# Patient Record
Sex: FEMALE | Race: WHITE
[De-identification: names, ages, dates, MRNs, and addresses within clinical notes are randomized per-mention and may not be internally consistent; named-entity substitution may affect disease eponyms.]

---

## 2017-01-07 ENCOUNTER — TRANSCRIPTION ENCOUNTER (OUTPATIENT)
Age: 52
End: 2017-01-07

## 2017-02-01 ENCOUNTER — EMERGENCY (EMERGENCY)
Facility: HOSPITAL | Age: 52
LOS: 0 days | Discharge: HOME | End: 2017-02-02

## 2017-06-22 ENCOUNTER — OUTPATIENT (OUTPATIENT)
Dept: OUTPATIENT SERVICES | Facility: HOSPITAL | Age: 52
LOS: 1 days | Discharge: HOME | End: 2017-06-22

## 2017-06-22 DIAGNOSIS — K62.89 OTHER SPECIFIED DISEASES OF ANUS AND RECTUM: ICD-10-CM

## 2017-06-22 DIAGNOSIS — K59.00 CONSTIPATION, UNSPECIFIED: ICD-10-CM

## 2017-06-27 DIAGNOSIS — K57.32 DIVERTICULITIS OF LARGE INTESTINE WITHOUT PERFORATION OR ABSCESS WITHOUT BLEEDING: ICD-10-CM

## 2017-06-27 DIAGNOSIS — R10.84 GENERALIZED ABDOMINAL PAIN: ICD-10-CM

## 2017-06-27 DIAGNOSIS — Z98.890 OTHER SPECIFIED POSTPROCEDURAL STATES: ICD-10-CM

## 2017-06-28 DIAGNOSIS — Z12.31 ENCOUNTER FOR SCREENING MAMMOGRAM FOR MALIGNANT NEOPLASM OF BREAST: ICD-10-CM

## 2017-07-17 ENCOUNTER — OUTPATIENT (OUTPATIENT)
Dept: OUTPATIENT SERVICES | Facility: HOSPITAL | Age: 52
LOS: 1 days | Discharge: HOME | End: 2017-07-17

## 2017-07-17 DIAGNOSIS — K59.00 CONSTIPATION, UNSPECIFIED: ICD-10-CM

## 2017-07-17 DIAGNOSIS — R92.8 OTHER ABNORMAL AND INCONCLUSIVE FINDINGS ON DIAGNOSTIC IMAGING OF BREAST: ICD-10-CM

## 2017-07-17 DIAGNOSIS — K62.89 OTHER SPECIFIED DISEASES OF ANUS AND RECTUM: ICD-10-CM

## 2018-08-01 ENCOUNTER — OUTPATIENT (OUTPATIENT)
Dept: OUTPATIENT SERVICES | Facility: HOSPITAL | Age: 53
LOS: 1 days | Discharge: HOME | End: 2018-08-01

## 2018-08-01 DIAGNOSIS — Z12.31 ENCOUNTER FOR SCREENING MAMMOGRAM FOR MALIGNANT NEOPLASM OF BREAST: ICD-10-CM

## 2019-04-22 ENCOUNTER — OUTPATIENT (OUTPATIENT)
Dept: OUTPATIENT SERVICES | Facility: HOSPITAL | Age: 54
LOS: 1 days | Discharge: HOME | End: 2019-04-22
Payer: MEDICAID

## 2019-04-22 DIAGNOSIS — N64.4 MASTODYNIA: ICD-10-CM

## 2019-04-22 PROCEDURE — 77062 BREAST TOMOSYNTHESIS BI: CPT | Mod: 26

## 2019-04-22 PROCEDURE — 76642 ULTRASOUND BREAST LIMITED: CPT | Mod: 26,LT

## 2019-04-22 PROCEDURE — 76641 ULTRASOUND BREAST COMPLETE: CPT | Mod: 26,RT

## 2019-04-22 PROCEDURE — G0279: CPT | Mod: 26

## 2019-04-22 PROCEDURE — 77066 DX MAMMO INCL CAD BI: CPT | Mod: 26

## 2020-06-30 ENCOUNTER — OUTPATIENT (OUTPATIENT)
Dept: OUTPATIENT SERVICES | Facility: HOSPITAL | Age: 55
LOS: 1 days | Discharge: HOME | End: 2020-06-30
Payer: MEDICAID

## 2020-06-30 DIAGNOSIS — J18.9 PNEUMONIA, UNSPECIFIED ORGANISM: ICD-10-CM

## 2020-06-30 PROCEDURE — 71046 X-RAY EXAM CHEST 2 VIEWS: CPT | Mod: 26

## 2020-07-09 ENCOUNTER — OUTPATIENT (OUTPATIENT)
Dept: OUTPATIENT SERVICES | Facility: HOSPITAL | Age: 55
LOS: 1 days | Discharge: HOME | End: 2020-07-09
Payer: MEDICAID

## 2020-07-09 DIAGNOSIS — Z12.31 ENCOUNTER FOR SCREENING MAMMOGRAM FOR MALIGNANT NEOPLASM OF BREAST: ICD-10-CM

## 2020-07-09 PROCEDURE — 77067 SCR MAMMO BI INCL CAD: CPT | Mod: 26

## 2020-07-09 PROCEDURE — 77063 BREAST TOMOSYNTHESIS BI: CPT | Mod: 26

## 2020-08-03 ENCOUNTER — EMERGENCY (EMERGENCY)
Facility: HOSPITAL | Age: 55
LOS: 0 days | Discharge: HOME | End: 2020-08-04
Attending: EMERGENCY MEDICINE | Admitting: EMERGENCY MEDICINE
Payer: MEDICAID

## 2020-08-03 VITALS
HEART RATE: 110 BPM | RESPIRATION RATE: 18 BRPM | SYSTOLIC BLOOD PRESSURE: 165 MMHG | TEMPERATURE: 99 F | WEIGHT: 164.02 LBS | OXYGEN SATURATION: 99 % | DIASTOLIC BLOOD PRESSURE: 101 MMHG

## 2020-08-03 DIAGNOSIS — R39.198 OTHER DIFFICULTIES WITH MICTURITION: ICD-10-CM

## 2020-08-03 DIAGNOSIS — K59.00 CONSTIPATION, UNSPECIFIED: ICD-10-CM

## 2020-08-03 PROCEDURE — 99284 EMERGENCY DEPT VISIT MOD MDM: CPT

## 2020-08-03 NOTE — ED ADULT TRIAGE NOTE - CHIEF COMPLAINT QUOTE
pt states she is constipated and cannot urinate. states the last time she urinated was an hour ago and it was a little amount. states her last bm was this afternoon. pt has hx of same problem, had urinary catheter placed.

## 2020-08-04 VITALS
DIASTOLIC BLOOD PRESSURE: 69 MMHG | SYSTOLIC BLOOD PRESSURE: 125 MMHG | OXYGEN SATURATION: 98 % | HEART RATE: 95 BPM | RESPIRATION RATE: 18 BRPM | TEMPERATURE: 98 F

## 2020-08-04 LAB
APPEARANCE UR: CLEAR — SIGNIFICANT CHANGE UP
BACTERIA # UR AUTO: NEGATIVE — SIGNIFICANT CHANGE UP
BILIRUB UR-MCNC: NEGATIVE — SIGNIFICANT CHANGE UP
COLOR SPEC: COLORLESS — SIGNIFICANT CHANGE UP
DIFF PNL FLD: ABNORMAL
EPI CELLS # UR: 0 /HPF — SIGNIFICANT CHANGE UP (ref 0–5)
GLUCOSE UR QL: NEGATIVE — SIGNIFICANT CHANGE UP
HYALINE CASTS # UR AUTO: 1 /LPF — SIGNIFICANT CHANGE UP (ref 0–7)
KETONES UR-MCNC: NEGATIVE — SIGNIFICANT CHANGE UP
LEUKOCYTE ESTERASE UR-ACNC: NEGATIVE — SIGNIFICANT CHANGE UP
NITRITE UR-MCNC: NEGATIVE — SIGNIFICANT CHANGE UP
PH UR: 6 — SIGNIFICANT CHANGE UP (ref 5–8)
PROT UR-MCNC: NEGATIVE — SIGNIFICANT CHANGE UP
RBC CASTS # UR COMP ASSIST: 5 /HPF — HIGH (ref 0–4)
SP GR SPEC: 1.01 — LOW (ref 1.01–1.02)
UROBILINOGEN FLD QL: SIGNIFICANT CHANGE UP
WBC UR QL: 0 /HPF — SIGNIFICANT CHANGE UP (ref 0–5)

## 2020-08-04 RX ADMIN — Medication 1 ENEMA: at 01:19

## 2020-08-04 NOTE — ED PROVIDER NOTE - NS ED ROS FT
Constitutional: no fever, chills, no recent weight loss, change in appetite or malaise  Eyes: no redness/discharge/pain/vision changes  ENT: no rhinorrhea/ear pain/sore throat  Cardiac: No chest pain, SOB or edema.  Respiratory: No cough or respiratory distress  GI: No nausea, vomiting, abdominal pain.  : No dysuria, frequency, urgency or hematuria  MS: no pain to back or extremities, no loss of ROM, no weakness  Neuro: No headache or weakness. No LOC.  Skin: No skin rash.  Endocrine: No history of thyroid disease or diabetes.  Except as documented in the HPI, all other systems are negative.

## 2020-08-04 NOTE — ED PROVIDER NOTE - PROGRESS NOTE DETAILS
pt refused to have RN give enema, I placed enema; pt sts has urge to defecate but "scared"; pt had large BM, feels much better. wants to go home. will f/u with GI and take otc stool softeners. Pt also urinated in ED. Full DC instructions discussed and patient knows when to seek immediate medical attention. Patient has proper follow-up. All results discussed with the patient they may require further work-up. Limitations of ED work-up discussed. All  questions and concerns from patient or family addressed. Understanding of insturctions verbalized BB: pt refused to have RN give enema, I placed enema; pt sts has urge to defecate but "scared"; BB: pt had large BM, feels much better. wants to go home. will f/u with GI and take otc stool softeners.

## 2020-08-04 NOTE — ED PROVIDER NOTE - CARE PROVIDER_API CALL
Luca Snowden  GASTROENTEROLOGY  [address]  [place] [address]  Phone: [phone]  Fax: [phone]  Follow Up Time:

## 2020-08-04 NOTE — ED PROVIDER NOTE - PHYSICAL EXAMINATION
CONSTITUTIONAL: Well-appearing; well-nourished; in no apparent distress.   CARDIOVASCULAR: Normal S1, S2; no murmurs, rubs, or gallops.   RESPIRATORY: Normal chest excursion with respiration; breath sounds clear and equal bilaterally; no wheezes, rhonchi, or rales.  GI/: Normal bowel sounds; non-distended; non-tender; no palpable organomegaly.   MS: No CVA tenderness.   SKIN: Normal for age and race; warm; dry; good turgor; no apparent lesions or exudate.   NEURO/PSYCH: A & O x 4; grossly unremarkable. mood and manner are appropriate. Grooming and personal hygiene are appropriate. No apparent thoughts of harm to self or others.

## 2020-08-04 NOTE — ED PROVIDER NOTE - PATIENT PORTAL LINK FT
You can access the FollowMyHealth Patient Portal offered by [facility] by registering at the following website: [email or web address]. By joining [employer]’s FollowMyHealth portal, you will also be able to view your health information using other applications (apps) compatible with our system.

## 2020-08-04 NOTE — ED ADULT NURSE REASSESSMENT NOTE - NS ED NURSE REASSESS COMMENT FT1
Patient is A&OX4 in NAD received fleet enema as per MD order. Patient had large BM and states feeling a lot better. Patient denies any sob or chest pain. Dispo pending

## 2020-08-04 NOTE — ED ADULT NURSE NOTE - OBJECTIVE STATEMENT
Patient presents to Er with son in NAD A&OX4 complaining she is constipated and cannot urinate. Patient states last urinated at 6 PM this evening and last moved her bowels was this morning. Patient denies any sob or chest pain at this time,

## 2020-08-04 NOTE — ED PROVIDER NOTE - OBJECTIVE STATEMENT
pt c/o constipation for 1-2 days and feeling as though she cannot urinate 2/2 constipation. reports similar episode in the past where she required grant cath, then enema and subsequently had resolution. requesting same. has been seen by GI, does not have upcoming appt. was planning to use enema at home, but afraid. Denies fever/chill/HA/dizziness/chest pain/palpitation/sob/abd pain/n/v

## 2020-08-04 NOTE — ED PROVIDER NOTE - CLINICAL SUMMARY MEDICAL DECISION MAKING FREE TEXT BOX
pt had large BM, feels much better. wants to go home. will f/u with GI. Full DC instructions discussed and patient knows when to seek immediate medical attention. Patient has proper follow-up. All results discussed with the patient they may require further work-up. Limitations of ED work-up discussed. All  questions and concerns from patient or family addressed. Understanding of insturctions verbalized

## 2020-08-04 NOTE — ED PROVIDER NOTE - ATTENDING CONTRIBUTION TO CARE
I personally evaluated the patient. I reviewed the Resident’s or Physician Assistant’s note (as assigned above), and agree with the findings and plan except as documented in my note.    55 y/o F w no PMH presents w 2 days of constipation and difficulty urinating. No abd pain. No n/v. No CP, SOB. No fever. No bloody stools.     CONSTITUTIONAL: NAD  SKIN: skin exam is warm and dry, no acute rash.  HEAD: Normocephalic; atraumatic.  EYES: PERRL, 3 mm bilateral, no nystagmus, EOM intact; conjunctiva and sclera clear.  ENT: No nasal discharge; airway clear.  NECK: Supple; non tender.+ full passive ROM in all directions. No JVD  CARD: S1, S2 normal; no murmurs, gallops, or rubs. Regular rate and rhythm. + Symmetric Strong Pulses  RESP: No wheezes, rales or rhonchi. Good air movement bilaterally  ABD: soft; non-distended; non-tender. No Rebound, No Gaurding, No signs of peritnitis, No CVA tenderness  EXT: Normal ROM. No clubbing, cyanosis or edema. Dp and Pt Pulses intact. Cap refill less than 3 seconds

## 2020-08-04 NOTE — ED ADULT NURSE REASSESSMENT NOTE - NS ED NURSE REASSESS COMMENT FT1
Patient was straight cath as per MD order. Patient urine was yellow in color, drained 800 CC. MD aware. Patient states is feeling better. Patient in NAD, denies any chest pain or sob.

## 2020-08-05 LAB
CULTURE RESULTS: NO GROWTH — SIGNIFICANT CHANGE UP
SPECIMEN SOURCE: SIGNIFICANT CHANGE UP

## 2021-08-07 ENCOUNTER — OUTPATIENT (OUTPATIENT)
Dept: OUTPATIENT SERVICES | Facility: HOSPITAL | Age: 56
LOS: 1 days | Discharge: HOME | End: 2021-08-07
Payer: MEDICAID

## 2021-08-07 DIAGNOSIS — Z12.31 ENCOUNTER FOR SCREENING MAMMOGRAM FOR MALIGNANT NEOPLASM OF BREAST: ICD-10-CM

## 2021-08-07 PROCEDURE — 77063 BREAST TOMOSYNTHESIS BI: CPT | Mod: 26

## 2021-08-07 PROCEDURE — 77067 SCR MAMMO BI INCL CAD: CPT | Mod: 26

## 2022-08-25 ENCOUNTER — OUTPATIENT (OUTPATIENT)
Dept: OUTPATIENT SERVICES | Facility: HOSPITAL | Age: 57
LOS: 1 days | Discharge: HOME | End: 2022-08-25

## 2022-08-25 DIAGNOSIS — Z12.31 ENCOUNTER FOR SCREENING MAMMOGRAM FOR MALIGNANT NEOPLASM OF BREAST: ICD-10-CM

## 2022-08-25 PROCEDURE — 77067 SCR MAMMO BI INCL CAD: CPT | Mod: 26

## 2022-08-25 PROCEDURE — 77063 BREAST TOMOSYNTHESIS BI: CPT | Mod: 26

## 2022-09-14 ENCOUNTER — APPOINTMENT (OUTPATIENT)
Dept: ORTHOPEDIC SURGERY | Facility: CLINIC | Age: 57
End: 2022-09-14

## 2022-10-10 ENCOUNTER — INPATIENT (INPATIENT)
Facility: HOSPITAL | Age: 57
LOS: 3 days | Discharge: HOME | End: 2022-10-14
Attending: INTERNAL MEDICINE | Admitting: INTERNAL MEDICINE

## 2022-10-10 VITALS
WEIGHT: 173.94 LBS | RESPIRATION RATE: 17 BRPM | DIASTOLIC BLOOD PRESSURE: 82 MMHG | HEIGHT: 64 IN | OXYGEN SATURATION: 98 % | SYSTOLIC BLOOD PRESSURE: 151 MMHG | HEART RATE: 99 BPM | TEMPERATURE: 98 F

## 2022-10-10 LAB
ALBUMIN SERPL ELPH-MCNC: 4.6 G/DL — SIGNIFICANT CHANGE UP (ref 3.5–5.2)
ALP SERPL-CCNC: 92 U/L — SIGNIFICANT CHANGE UP (ref 30–115)
ALT FLD-CCNC: 95 U/L — HIGH (ref 0–41)
ANION GAP SERPL CALC-SCNC: 11 MMOL/L — SIGNIFICANT CHANGE UP (ref 7–14)
AST SERPL-CCNC: 85 U/L — HIGH (ref 0–41)
BASOPHILS # BLD AUTO: 0.03 K/UL — SIGNIFICANT CHANGE UP (ref 0–0.2)
BASOPHILS NFR BLD AUTO: 0.5 % — SIGNIFICANT CHANGE UP (ref 0–1)
BILIRUB SERPL-MCNC: 0.3 MG/DL — SIGNIFICANT CHANGE UP (ref 0.2–1.2)
BUN SERPL-MCNC: 12 MG/DL — SIGNIFICANT CHANGE UP (ref 10–20)
CALCIUM SERPL-MCNC: 8.8 MG/DL — SIGNIFICANT CHANGE UP (ref 8.4–10.5)
CHLORIDE SERPL-SCNC: 102 MMOL/L — SIGNIFICANT CHANGE UP (ref 98–110)
CO2 SERPL-SCNC: 27 MMOL/L — SIGNIFICANT CHANGE UP (ref 17–32)
CREAT SERPL-MCNC: 0.6 MG/DL — LOW (ref 0.7–1.5)
CRP SERPL-MCNC: 17.2 MG/L — HIGH
EGFR: 105 ML/MIN/1.73M2 — SIGNIFICANT CHANGE UP
EOSINOPHIL # BLD AUTO: 0.06 K/UL — SIGNIFICANT CHANGE UP (ref 0–0.7)
EOSINOPHIL NFR BLD AUTO: 0.9 % — SIGNIFICANT CHANGE UP (ref 0–8)
GLUCOSE SERPL-MCNC: 102 MG/DL — HIGH (ref 70–99)
HCG SERPL QL: NEGATIVE — SIGNIFICANT CHANGE UP
HCT VFR BLD CALC: 34.3 % — LOW (ref 37–47)
HGB BLD-MCNC: 11.5 G/DL — LOW (ref 12–16)
IMM GRANULOCYTES NFR BLD AUTO: 0.3 % — SIGNIFICANT CHANGE UP (ref 0.1–0.3)
LYMPHOCYTES # BLD AUTO: 1.5 K/UL — SIGNIFICANT CHANGE UP (ref 1.2–3.4)
LYMPHOCYTES # BLD AUTO: 23 % — SIGNIFICANT CHANGE UP (ref 20.5–51.1)
MCHC RBC-ENTMCNC: 28.2 PG — SIGNIFICANT CHANGE UP (ref 27–31)
MCHC RBC-ENTMCNC: 33.5 G/DL — SIGNIFICANT CHANGE UP (ref 32–37)
MCV RBC AUTO: 84.1 FL — SIGNIFICANT CHANGE UP (ref 81–99)
MONOCYTES # BLD AUTO: 0.48 K/UL — SIGNIFICANT CHANGE UP (ref 0.1–0.6)
MONOCYTES NFR BLD AUTO: 7.4 % — SIGNIFICANT CHANGE UP (ref 1.7–9.3)
NEUTROPHILS # BLD AUTO: 4.43 K/UL — SIGNIFICANT CHANGE UP (ref 1.4–6.5)
NEUTROPHILS NFR BLD AUTO: 67.9 % — SIGNIFICANT CHANGE UP (ref 42.2–75.2)
NRBC # BLD: 0 /100 WBCS — SIGNIFICANT CHANGE UP (ref 0–0)
PLATELET # BLD AUTO: 241 K/UL — SIGNIFICANT CHANGE UP (ref 130–400)
POTASSIUM SERPL-MCNC: 5.1 MMOL/L — HIGH (ref 3.5–5)
POTASSIUM SERPL-SCNC: 5.1 MMOL/L — HIGH (ref 3.5–5)
PROT SERPL-MCNC: 7.4 G/DL — SIGNIFICANT CHANGE UP (ref 6–8)
RBC # BLD: 4.08 M/UL — LOW (ref 4.2–5.4)
RBC # FLD: 12.6 % — SIGNIFICANT CHANGE UP (ref 11.5–14.5)
SARS-COV-2 RNA SPEC QL NAA+PROBE: SIGNIFICANT CHANGE UP
SODIUM SERPL-SCNC: 140 MMOL/L — SIGNIFICANT CHANGE UP (ref 135–146)
WBC # BLD: 6.52 K/UL — SIGNIFICANT CHANGE UP (ref 4.8–10.8)
WBC # FLD AUTO: 6.52 K/UL — SIGNIFICANT CHANGE UP (ref 4.8–10.8)

## 2022-10-10 PROCEDURE — 70491 CT SOFT TISSUE NECK W/DYE: CPT | Mod: 26,MA

## 2022-10-10 PROCEDURE — 99285 EMERGENCY DEPT VISIT HI MDM: CPT

## 2022-10-10 PROCEDURE — 99223 1ST HOSP IP/OBS HIGH 75: CPT

## 2022-10-10 RX ORDER — KETOROLAC TROMETHAMINE 30 MG/ML
15 SYRINGE (ML) INJECTION ONCE
Refills: 0 | Status: DISCONTINUED | OUTPATIENT
Start: 2022-10-10 | End: 2022-10-10

## 2022-10-10 RX ORDER — LANOLIN ALCOHOL/MO/W.PET/CERES
3 CREAM (GRAM) TOPICAL AT BEDTIME
Refills: 0 | Status: DISCONTINUED | OUTPATIENT
Start: 2022-10-10 | End: 2022-10-14

## 2022-10-10 RX ORDER — KETOROLAC TROMETHAMINE 30 MG/ML
15 SYRINGE (ML) INJECTION EVERY 8 HOURS
Refills: 0 | Status: DISCONTINUED | OUTPATIENT
Start: 2022-10-10 | End: 2022-10-11

## 2022-10-10 RX ORDER — ENOXAPARIN SODIUM 100 MG/ML
40 INJECTION SUBCUTANEOUS EVERY 24 HOURS
Refills: 0 | Status: DISCONTINUED | OUTPATIENT
Start: 2022-10-10 | End: 2022-10-14

## 2022-10-10 RX ORDER — MORPHINE SULFATE 50 MG/1
2 CAPSULE, EXTENDED RELEASE ORAL EVERY 4 HOURS
Refills: 0 | Status: DISCONTINUED | OUTPATIENT
Start: 2022-10-10 | End: 2022-10-12

## 2022-10-10 RX ORDER — METHOCARBAMOL 500 MG/1
750 TABLET, FILM COATED ORAL THREE TIMES A DAY
Refills: 0 | Status: DISCONTINUED | OUTPATIENT
Start: 2022-10-10 | End: 2022-10-14

## 2022-10-10 RX ORDER — MORPHINE SULFATE 50 MG/1
4 CAPSULE, EXTENDED RELEASE ORAL ONCE
Refills: 0 | Status: DISCONTINUED | OUTPATIENT
Start: 2022-10-10 | End: 2022-10-10

## 2022-10-10 RX ORDER — ONDANSETRON 8 MG/1
4 TABLET, FILM COATED ORAL EVERY 8 HOURS
Refills: 0 | Status: DISCONTINUED | OUTPATIENT
Start: 2022-10-10 | End: 2022-10-14

## 2022-10-10 RX ORDER — ACETAMINOPHEN 500 MG
650 TABLET ORAL EVERY 6 HOURS
Refills: 0 | Status: DISCONTINUED | OUTPATIENT
Start: 2022-10-10 | End: 2022-10-14

## 2022-10-10 RX ORDER — METHOCARBAMOL 500 MG/1
750 TABLET, FILM COATED ORAL ONCE
Refills: 0 | Status: COMPLETED | OUTPATIENT
Start: 2022-10-10 | End: 2022-10-10

## 2022-10-10 RX ORDER — ONDANSETRON 8 MG/1
4 TABLET, FILM COATED ORAL ONCE
Refills: 0 | Status: COMPLETED | OUTPATIENT
Start: 2022-10-10 | End: 2022-10-10

## 2022-10-10 RX ORDER — SODIUM CHLORIDE 9 MG/ML
1000 INJECTION INTRAMUSCULAR; INTRAVENOUS; SUBCUTANEOUS ONCE
Refills: 0 | Status: COMPLETED | OUTPATIENT
Start: 2022-10-10 | End: 2022-10-10

## 2022-10-10 RX ADMIN — Medication 15 MILLIGRAM(S): at 15:18

## 2022-10-10 RX ADMIN — METHOCARBAMOL 750 MILLIGRAM(S): 500 TABLET, FILM COATED ORAL at 15:18

## 2022-10-10 RX ADMIN — SODIUM CHLORIDE 2000 MILLILITER(S): 9 INJECTION INTRAMUSCULAR; INTRAVENOUS; SUBCUTANEOUS at 21:20

## 2022-10-10 RX ADMIN — Medication 15 MILLIGRAM(S): at 15:33

## 2022-10-10 RX ADMIN — MORPHINE SULFATE 4 MILLIGRAM(S): 50 CAPSULE, EXTENDED RELEASE ORAL at 15:59

## 2022-10-10 RX ADMIN — MORPHINE SULFATE 4 MILLIGRAM(S): 50 CAPSULE, EXTENDED RELEASE ORAL at 16:14

## 2022-10-10 NOTE — CONSULT NOTE ADULT - SUBJECTIVE AND OBJECTIVE BOX
HPI: 56 y/o female with no history of trauma with sudden onset of pain posterior neck at 8pm last night and this morning anterior neck pain with pain on swallowing, Neuro intact.      PAST MEDICAL & SURGICAL HISTORY:  No pertinent past medical history    Home Medications:    Allergies    No Known Allergies    Intolerances    MEDICATIONS  (STANDING):    MEDICATIONS  (PRN):    ICU Vital Signs Last 24 Hrs  T(C): 36.4 (10 Oct 2022 13:01), Max: 36.4 (10 Oct 2022 13:01)  T(F): 97.6 (10 Oct 2022 13:01), Max: 97.6 (10 Oct 2022 13:01)  HR: 99 (10 Oct 2022 13:01) (99 - 99)  BP: 151/82 (10 Oct 2022 13:01) (151/82 - 151/82)  BP(mean): --  ABP: --  ABP(mean): --  RR: 17 (10 Oct 2022 13:01) (17 - 17)  SpO2: 98% (10 Oct 2022 13:01) (98% - 98%)    O2 Parameters below as of 10 Oct 2022 13:01  Patient On (Oxygen Delivery Method): room air    I&O's Detail    CBC Full  -  ( 10 Oct 2022 14:40 )  WBC Count : 6.52 K/uL  RBC Count : 4.08 M/uL  Hemoglobin : 11.5 g/dL  Hematocrit : 34.3 %  Platelet Count - Automated : 241 K/uL  Mean Cell Volume : 84.1 fL  Mean Cell Hemoglobin : 28.2 pg  Mean Cell Hemoglobin Concentration : 33.5 g/dL  Auto Neutrophil # : 4.43 K/uL  Auto Lymphocyte # : 1.50 K/uL  Auto Monocyte # : 0.48 K/uL  Auto Eosinophil # : 0.06 K/uL  Auto Basophil # : 0.03 K/uL  Auto Neutrophil % : 67.9 %  Auto Lymphocyte % : 23.0 %  Auto Monocyte % : 7.4 %  Auto Eosinophil % : 0.9 %  Auto Basophil % : 0.5 %    10-10    140  |  102  |  12  ----------------------------<  102<H>  5.1<H>   |  27  |  0.6<L>    Ca    8.8      10 Oct 2022 14:40    TPro  7.4  /  Alb  4.6  /  TBili  0.3  /  DBili  x   /  AST  85<H>  /  ALT  95<H>  /  AlkPhos  92  10-10      AAOX3. Verbal function intact  tongue midline, facial motions symmetric  PERRLA, EOMI  Pronator Drift: neg  Finger to Nose intact  Motor: MAEx4, 5/5 power in b/l UE and LE  Sensation: intact to touch in all extremities  pain on swallowing and posterior neck.      Imaging: < from: CT Neck Soft Tissue w/ IV Cont (10.10.22 @ 15:58) >  IMPRESSION:    1.  Amorphous calcifications in the midline prevertebral space at C1-2   with probable trace prevertebral edema (partially obscured by artifact).   Findings are suggestive of calcific tendinitis of the longus colli muscle.    2.  Apparent mild cortical erosion along the anterior aspect of the dens,   unusual for calcific tendinitis. Given this as well as the streak   artifact from dental hardware limiting evaluation of this region, MRI   cervical spine may be obtained for further evaluation.    3.  1.1 cm left thyroid nodule may be further evaluated with thyroid   sonogram an outpatient basis.      < end of copied text >      Assessment/Plan - D/W Dr Lynn, recommend soft collar, ESR, CRP, muscle relaxants, analgesics, MRI with and without contrast for further investigation. HPI: 58 y/o female with no history of trauma with sudden onset of pain posterior neck at 8pm last night and this morning anterior neck pain with pain on swallowing, Neuro intact.      PAST MEDICAL & SURGICAL HISTORY:  No pertinent past medical history    Home Medications:    Allergies    No Known Allergies    Intolerances    MEDICATIONS  (STANDING):    MEDICATIONS  (PRN):    ICU Vital Signs Last 24 Hrs  T(C): 36.4 (10 Oct 2022 13:01), Max: 36.4 (10 Oct 2022 13:01)  T(F): 97.6 (10 Oct 2022 13:01), Max: 97.6 (10 Oct 2022 13:01)  HR: 99 (10 Oct 2022 13:01) (99 - 99)  BP: 151/82 (10 Oct 2022 13:01) (151/82 - 151/82)  BP(mean): --  ABP: --  ABP(mean): --  RR: 17 (10 Oct 2022 13:01) (17 - 17)  SpO2: 98% (10 Oct 2022 13:01) (98% - 98%)    O2 Parameters below as of 10 Oct 2022 13:01  Patient On (Oxygen Delivery Method): room air    I&O's Detail    CBC Full  -  ( 10 Oct 2022 14:40 )  WBC Count : 6.52 K/uL  RBC Count : 4.08 M/uL  Hemoglobin : 11.5 g/dL  Hematocrit : 34.3 %  Platelet Count - Automated : 241 K/uL  Mean Cell Volume : 84.1 fL  Mean Cell Hemoglobin : 28.2 pg  Mean Cell Hemoglobin Concentration : 33.5 g/dL  Auto Neutrophil # : 4.43 K/uL  Auto Lymphocyte # : 1.50 K/uL  Auto Monocyte # : 0.48 K/uL  Auto Eosinophil # : 0.06 K/uL  Auto Basophil # : 0.03 K/uL  Auto Neutrophil % : 67.9 %  Auto Lymphocyte % : 23.0 %  Auto Monocyte % : 7.4 %  Auto Eosinophil % : 0.9 %  Auto Basophil % : 0.5 %    10-10    140  |  102  |  12  ----------------------------<  102<H>  5.1<H>   |  27  |  0.6<L>    Ca    8.8      10 Oct 2022 14:40    TPro  7.4  /  Alb  4.6  /  TBili  0.3  /  DBili  x   /  AST  85<H>  /  ALT  95<H>  /  AlkPhos  92  10-10      AAOX3. Verbal function intact  tongue midline, facial motions symmetric  PERRLA, EOMI  Pronator Drift: neg  Finger to Nose intact  Motor: MAEx4, 5/5 power in b/l UE and LE  Sensation: intact to touch in all extremities  pain on swallowing and posterior neck.      Imaging: < from: CT Neck Soft Tissue w/ IV Cont (10.10.22 @ 15:58) >  IMPRESSION:    1.  Amorphous calcifications in the midline prevertebral space at C1-2   with probable trace prevertebral edema (partially obscured by artifact).   Findings are suggestive of calcific tendinitis of the longus colli muscle.    2.  Apparent mild cortical erosion along the anterior aspect of the dens,   unusual for calcific tendinitis. Given this as well as the streak   artifact from dental hardware limiting evaluation of this region, MRI   cervical spine may be obtained for further evaluation.    3.  1.1 cm left thyroid nodule may be further evaluated with thyroid   sonogram an outpatient basis.      < end of copied text >      Assessment/Plan - D/W Dr Lynn, recommend soft collar, ESR, CRP, muscle relaxants, analgesics, MRI of C spine with and without contrast for further investigation.

## 2022-10-10 NOTE — ED PROVIDER NOTE - ATTENDING CONTRIBUTION TO CARE
57-year-old female no past medical history presents with neck pain since yesterday.  Sharp constant gradual onset progressively worsening nonradiating.  Worse with movement and with swallowing.  No fever cough URI symptoms ear pain sore throat dental pain.  No numbness weakness.  No trauma.  No headache.  No chest pain shortness of breath.        On exam, AFVSS, Well appearing, No acute distress, NCAT, EOMI, PERRLA, MMM, Neck supple, OP no erythema or exudates or edema, normal speech no drooling, notable pain with swallowing, aaox3, CN 2-12 intact, No nystagmus.  5/5 motor x 4 ext, SILT x 4 extremities, No facial droop or slurred speech. No pronator drift.  . No midline C/T/L tenderness to palpation or step off.  Left-sided paraspinal cervical trapezius tenderness to palpation and spasm., No LE edema or calf TTP,        A/P; neck pain, pain with swallowing, will get labs soft tissue neck CT pain control reeval

## 2022-10-10 NOTE — H&P ADULT - NSHPLABSRESULTS_GEN_ALL_CORE
LABS:                          11.5   6.52  )-----------( 241      ( 10 Oct 2022 14:40 )             34.3     10-10    140  |  102  |  12  ----------------------------<  102<H>  5.1<H>   |  27  |  0.6<L>    Ca    8.8      10 Oct 2022 14:40    TPro  7.4  /  Alb  4.6  /  TBili  0.3  /  DBili  x   /  AST  85<H>  /  ALT  95<H>  /  AlkPhos  92  10-10    LIVER FUNCTIONS - ( 10 Oct 2022 14:40 )  Alb: 4.6 g/dL / Pro: 7.4 g/dL / ALK PHOS: 92 U/L / ALT: 95 U/L / AST: 85 U/L / GGT: x

## 2022-10-10 NOTE — H&P ADULT - ASSESSMENT
56 yo female patient with no PMHx presenting for neck pain and found to have calcific tendinitis of the longus colli muscle and mild cortical erosion along the anterior aspect of the dens. patient admitted for further investigation    #neck pain  #calcific tendinitis  #cortical erosion of dens  - no sepsis POA  - no fever or leukocytosis  - CT neck ->  Amorphous calcifications in the midline prevertebral space at C1-2 with probable trace prevertebral edema (partially obscured by artifact). Findings are suggestive of calcific tendinitis of the longus colli muscle. Apparent mild cortical erosion along the anterior aspect of the dens, unusual for calcific tendinitis. Given this as well as the streak artifact from dental hardware limiting evaluation of this region, MRI cervical spine may be obtained for further evaluation.  - Neurosurgery -> soft collar, ESR, CRP, muscle relaxants, analgesics, MRI of C spine with and without contrast for further investigation.  -     #left thyroid Nodue  - CT neck -> 1.1 cm left thyroid nodule  - thyroid sonogram on outpatient basis.    #positive serum pregnancy test    #MISC  DVT PPX  GI PPX  DIET Regular  CODE FULL   56 yo female patient with no PMHx presenting for neck pain and found to have calcific tendinitis of the longus colli muscle and mild cortical erosion along the anterior aspect of the dens. patient admitted for further investigation    #neck pain  #calcific tendinitis  #cortical erosion of dens  - no sepsis POA  - no fever or leukocytosis  - no trauma  - started suddenly 1 day PTP and worsened gradually  - CT neck ->  Amorphous calcifications in the midline prevertebral space at C1-2 with probable trace prevertebral edema (partially obscured by artifact). Findings are suggestive of calcific tendinitis of the longus colli muscle. Apparent mild cortical erosion along the anterior aspect of the dens, unusual for calcific tendinitis. Given this as well as the streak artifact from dental hardware limiting evaluation of this region, MRI cervical spine may be obtained for further evaluation.  - Neurosurgery -> soft collar, ESR, CRP, muscle relaxants, analgesics, MRI of C spine with and without contrast for further investigation.  - c/w morphine, Robaxin and ketorolac    #left thyroid Nodule  - CT neck -> 1.1 cm left thyroid nodule  - thyroid sonogram on outpatient basis.    #positive serum pregnancy test  - LMP many years ago  - patient is postmenopausal  -  with children  - will repeat serum pregnancy test  - f/u urine pregnancy test  - f/u beta HCG quantitative  - consider obgyn consult if positive    #MISC  DVT PPX lovenox  GI PPX not needed  DIET Regular  CODE FULL   56 yo female patient with no PMHx presenting for neck pain and found to have calcific tendinitis of the longus colli muscle and mild cortical erosion along the anterior aspect of the dens. patient admitted for further investigation    #neck pain  #calcific tendinitis  #cortical erosion of dens  - no sepsis POA  - no fever or leukocytosis  - no trauma  - started suddenly 1 day PTP and worsened gradually  - CT neck ->  Amorphous calcifications in the midline prevertebral space at C1-2 with probable trace prevertebral edema (partially obscured by artifact). Findings are suggestive of calcific tendinitis of the longus colli muscle. Apparent mild cortical erosion along the anterior aspect of the dens, unusual for calcific tendinitis. Given this as well as the streak artifact from dental hardware limiting evaluation of this region, MRI cervical spine may be obtained for further evaluation.  - Neurosurgery -> soft collar, ESR, CRP, muscle relaxants, analgesics, MRI of C spine with and without contrast for further investigation.  - c/w morphine, Robaxin and ketorolac    #left thyroid Nodule  - CT neck -> 1.1 cm left thyroid nodule  - thyroid sonogram on outpatient basis.    #positive serum pregnancy test  - LMP many years ago  - patient is postmenopausal  -  with children  - will repeat serum pregnancy test  - f/u urine pregnancy test  - f/u beta HCG quantitative  - consider obgyn consult if positive    #transaminitis  - AST 85 ALT 95  - will monitor  - consider US if no improvement    #anemia - normocytic  - Hb 11.5 MCV 84  - f/u iron stuides    #MISC  DVT PPX lovenox  GI PPX not needed  DIET Regular  CODE FULL

## 2022-10-10 NOTE — ED PROVIDER NOTE - PHYSICAL EXAMINATION
CONSTITUTIONAL: awake, sitting in stretcher, appears uncomfortable  SKIN: Warm, dry  HEAD: Normocephalic; atraumatic  EYES: No conjunctival injection. EOMI. PERRL  ENT: No trismus, no visible swelling in subligual, or pharyngeal spaces, No nasal discharge; oropharynx nonerythematous; airway clear  NECK: +left paraspinal tenderness with spasm, mild midline tenderness, no palpable stepoff, no palpable anterior masses  CARD:  Regular rate and rhythm; S1, S2 normal; no murmurs, gallops, or rubs  RESP: CTAB; No wheezes, crackles, rales or rhonchi  ABD: Soft, nontender, nondistended, nonrigid, no guarding or rebound tenderness  EXT: Normal ROM,  no edema, good radial pulse  NEURO: AOx3, speaking in full clear sentences, no dysarthria. CN 2-12 grossly intact. No facial droop. No pronator drift. Strength and sensation intact and symmetric in all extremities. Finger to nose and heel to shin WNL. Able to abulate with steady gait.

## 2022-10-10 NOTE — H&P ADULT - ATTENDING COMMENTS
HPI:  58 yo female patient with no PMHx presenting for the above chief complaint. neck started yesterday morning. it was mild in severity and intensifies until late night to become unbearable. patient mentioned pain in back of her neck, 10/10 in severity, constant, non radiating, described as "nerve damage pain), not relieved with anything in particular, exacerbated with movement. she mentioned dysphagia to her own saliva associated with pain in the same location but denied fever, chills, SOB, cough, rhinorrhea, chest pain, abdominal pain, urine or bowel changes.    VITALS:   T(C): 36.4 (10-10-22 @ 13:01), Max: 36.4 (10-10-22 @ 13:01)  HR: 99 (10-10-22 @ 13:01) (99 - 99)  BP: 151/82 (10-10-22 @ 13:01) (151/82 - 151/82)  RR: 17 (10-10-22 @ 13:01) (17 - 17)  SpO2: 98% (10-10-22 @ 13:01) (98% - 98%)    in ED, vitals were WNL. labs were significant for anemia (Hb 11), transaminitis (AST 85 ALT 95) and a positive serum pregnancy test. CT neck was done and showed Amorphous calcifications in the midline prevertebral space at C1-2 with probable trace prevertebral edema (partially obscured by artifact). Findings are suggestive of calcific tendinitis of the longus colli muscle. Apparent mild cortical erosion along the anterior aspect of the dens, unusual for calcific tendinitis. Given this as well as the streak artifact from dental hardware limiting evaluation of this region, MRI cervical spine may be obtained for further evaluation. neurosurgery were consulted and recommended MRI. patient admitted to medicine for further investigation   (10 Oct 2022 20:50)  REVIEW OF SYSTEMS:    CONSTITUTIONAL: No weakness, fevers or chills  EYES/ENT: No visual changes;  No vertigo or throat pain   NECK: (+) pain or stiffness  RESPIRATORY: No cough, wheezing, hemoptysis; No shortness of breath  CARDIOVASCULAR: No chest pain or palpitations  GASTROINTESTINAL: No abdominal or epigastric pain. No nausea, vomiting, or hematemesis; No diarrhea or constipation. No melena or hematochezia.  GENITOURINARY: No dysuria, frequency or hematuria  NEUROLOGICAL: No numbness or weakness, see cc/HPI   SKIN: No itching, rashes    Physical Exam:   General: WN/WD NAD  Neurology: A&Ox3, nonfocal, follows commands  Eyes: PERRLA/ EOMI  ENT/Neck: Neck supple, trachea midline, No JVD  Respiratory: CTA B/L, No wheezing, rales, rhonchi  CV: Normal rate regular rhythm, S1S2, no murmurs, rubs or gallops  Abdominal: Soft, NT, ND +BS,   Extremities: No edema, + peripheral pulses  Skin: No Rashes, Hematoma, Ecchymosis    A/p  Neck pain   Calcific tendonitis   Cortical erosion of the dens  -MRI of the C-spine   -Neurosurgery follow up  -agree w/ collar / muscle relaxer and PRN pain Rx  --ESR and CRP    Thyroid nodule   -Endo eval and U/S w/ biopsy as an outpatient     Abnormal BHCG - in post-menopausal female r/o lab error   -repeat BHCG and if (+) ---> Gyn U/S     Transaminitis - mild ?? etiology   - check CLD workup    Anemia - stable   -outpatient eval and HCM - colonoscopy PRN    DVT prophylaxis HPI:  58 yo female patient with no PMHx presenting for the above chief complaint. neck started yesterday morning. it was mild in severity and intensifies until late night to become unbearable. patient mentioned pain in back of her neck, 10/10 in severity, constant, non radiating, described as "nerve damage pain), not relieved with anything in particular, exacerbated with movement. she mentioned dysphagia to her own saliva associated with pain in the same location but denied fever, chills, SOB, cough, rhinorrhea, chest pain, abdominal pain, urine or bowel changes.    VITALS:   T(C): 36.4 (10-10-22 @ 13:01), Max: 36.4 (10-10-22 @ 13:01)  HR: 99 (10-10-22 @ 13:01) (99 - 99)  BP: 151/82 (10-10-22 @ 13:01) (151/82 - 151/82)  RR: 17 (10-10-22 @ 13:01) (17 - 17)  SpO2: 98% (10-10-22 @ 13:01) (98% - 98%)    in ED, vitals were WNL. labs were significant for anemia (Hb 11), transaminitis (AST 85 ALT 95) and a positive serum pregnancy test. CT neck was done and showed Amorphous calcifications in the midline prevertebral space at C1-2 with probable trace prevertebral edema (partially obscured by artifact). Findings are suggestive of calcific tendinitis of the longus colli muscle. Apparent mild cortical erosion along the anterior aspect of the dens, unusual for calcific tendinitis. Given this as well as the streak artifact from dental hardware limiting evaluation of this region, MRI cervical spine may be obtained for further evaluation. neurosurgery were consulted and recommended MRI. patient admitted to medicine for further investigation   (10 Oct 2022 20:50)  REVIEW OF SYSTEMS:    CONSTITUTIONAL: No weakness, fevers or chills  EYES/ENT: No visual changes;  No vertigo or throat pain   NECK: (+) pain or stiffness  RESPIRATORY: No cough, wheezing, hemoptysis; No shortness of breath  CARDIOVASCULAR: No chest pain or palpitations  GASTROINTESTINAL: No abdominal or epigastric pain. No nausea, vomiting, or hematemesis; No diarrhea or constipation. No melena or hematochezia.  GENITOURINARY: No dysuria, frequency or hematuria  NEUROLOGICAL: No numbness or weakness, see cc/HPI   SKIN: No itching, rashes    Physical Exam:   General: WN/WD NAD  Neurology: A&Ox3, nonfocal, follows commands  Eyes: PERRLA/ EOMI  ENT/Neck: Neck supple, trachea midline, No JVD  Respiratory: CTA B/L, No wheezing, rales, rhonchi  CV: Normal rate regular rhythm, S1S2, no murmurs, rubs or gallops  Abdominal: Soft, NT, ND +BS,   Extremities: No edema, + peripheral pulses  Skin: No Rashes, Hematoma, Ecchymosis    A/p  Neck pain   Calcific tendonitis   Cortical erosion of the dens  -MRI of the C-spine   -Neurosurgery follow up  -agree w/ collar / muscle relaxer and PRN pain Rx  --ESR and CRP    Thyroid nodule   -Endo eval and U/S w/ biopsy as an outpatient     Abnormal BHCG - in post-menopausal female r/o lab error   -repeat BHCG and if (+) ---> Gyn U/S     Transaminitis - mild ?? etiology   - check CLD workup    Anemia - stable   -outpatient eval and HCM - colonoscopy PRN    DVT prophylaxis    PATIENT SEEN by ATTENDING 10/10/22 ( note revised 10/11)

## 2022-10-10 NOTE — H&P ADULT - HISTORY OF PRESENT ILLNESS
56 yo female patient with no PMHx presenting for the above chief complaint    VITALS:   T(C): 36.4 (10-10-22 @ 13:01), Max: 36.4 (10-10-22 @ 13:01)  HR: 99 (10-10-22 @ 13:01) (99 - 99)  BP: 151/82 (10-10-22 @ 13:01) (151/82 - 151/82)  RR: 17 (10-10-22 @ 13:01) (17 - 17)  SpO2: 98% (10-10-22 @ 13:01) (98% - 98%)    in ED, vitals were WNL. labs were significant for anemia (Hb 11), transaminitis (AST 85 ALT 95) and a positive serum pregnancy test. CT neck was done and showed Amorphous calcifications in the midline prevertebral space at C1-2 with probable trace prevertebral edema (partially obscured by artifact). Findings are suggestive of calcific tendinitis of the longus colli muscle. Apparent mild cortical erosion along the anterior aspect of the dens, unusual for calcific tendinitis. Given this as well as the streak artifact from dental hardware limiting evaluation of this region, MRI cervical spine may be obtained for further evaluation. neurosurgery were consulted and recommended MRI. patient admitted to medicine for further investigation   56 yo female patient with no PMHx presenting for the above chief complaint. neck started yesterday morning. it was mild in severity and intensifies until late night to become unbearable. patient mentioned pain in back of her neck, 10/10 in severity, constant, non radiating, described as "nerve damage pain), not relieved with anything in particular, exacerbated with movement. she mentioned dysphagia to her own saliva associated with pain in the same location but denied fever, chills, SOB, cough, rhinorrhea, chest pain, abdominal pain, urine or bowel changes.    VITALS:   T(C): 36.4 (10-10-22 @ 13:01), Max: 36.4 (10-10-22 @ 13:01)  HR: 99 (10-10-22 @ 13:01) (99 - 99)  BP: 151/82 (10-10-22 @ 13:01) (151/82 - 151/82)  RR: 17 (10-10-22 @ 13:01) (17 - 17)  SpO2: 98% (10-10-22 @ 13:01) (98% - 98%)    in ED, vitals were WNL. labs were significant for anemia (Hb 11), transaminitis (AST 85 ALT 95) and a positive serum pregnancy test. CT neck was done and showed Amorphous calcifications in the midline prevertebral space at C1-2 with probable trace prevertebral edema (partially obscured by artifact). Findings are suggestive of calcific tendinitis of the longus colli muscle. Apparent mild cortical erosion along the anterior aspect of the dens, unusual for calcific tendinitis. Given this as well as the streak artifact from dental hardware limiting evaluation of this region, MRI cervical spine may be obtained for further evaluation. neurosurgery were consulted and recommended MRI. patient admitted to medicine for further investigation

## 2022-10-10 NOTE — ED PROVIDER NOTE - OBJECTIVE STATEMENT
57yoF no PMHx presenting for worsening left-sided neck pain since 7pm last night. Patient describes the pain as constant, 10/10, radiating anteriorly into the front of her throat, making it difficult to swallow. Patient reports she had a cold 1 week ago that resolved. Denies any fever, nausea, vomiting, throat swelling sensation, rashes, weakness, numbness or tingling. Denies any recent or prior trauma to the neck.

## 2022-10-10 NOTE — ED PROVIDER NOTE - CLINICAL SUMMARY MEDICAL DECISION MAKING FREE TEXT BOX
pt seen by NS for CT findings, rec admit for mri, soft collar. pt informed of +HCG, states post menopausal, advised needs f/u as outpt w OBGYN for further work up

## 2022-10-10 NOTE — H&P ADULT - NSHPPHYSICALEXAM_GEN_ALL_CORE
GENERAL: NAD, lying in bed comfortably  HEAD:  Atraumatic, Normocephalic  EYES: EOMI, PERRLA, conjunctiva and sclera clear  ENT: Moist mucous membranes  NECK: Supple, No JVD  CHEST/LUNG: Clear to auscultation bilaterally; No rales, rhonchi, wheezing, or rubs. Unlabored respirations  HEART: Regular rate and rhythm; No murmurs, rubs, or gallops  ABDOMEN: BSx4; Soft, nontender, nondistended  EXTREMITIES:  2+ Peripheral Pulses, brisk capillary refill. No clubbing, cyanosis, or edema  NERVOUS SYSTEM:  A&Ox3, no focal deficits   SKIN: No rashes or lesions GENERAL: in mild distress and pain  HEAD:  Atraumatic, Normocephalic  EYES: EOMI, PERRLA, conjunctiva and sclera clear  ENT: Moist mucous membranes  NECK: Supple, No JVD  CHEST/LUNG: Clear to auscultation bilaterally; No rales, rhonchi, wheezing, or rubs. Unlabored respirations  HEART: Regular rate and rhythm; No murmurs, rubs, or gallops  ABDOMEN: BSx4; Soft, nontender, nondistended  EXTREMITIES:  2+ Peripheral Pulses, brisk capillary refill. No clubbing, cyanosis, or edema  NERVOUS SYSTEM:  A&Ox3, no focal deficits

## 2022-10-10 NOTE — ED PROVIDER NOTE - PROGRESS NOTE DETAILS
MONICA -- patient sitting up, stable, appears uncomfortable,  reports morphine 4mg IV only mildly improves pain. CT shows calcifications around C1 and erosion to anterior dens. Neuosurgery consulted, pending recs MONICA -- Neurosurg recommends admit for MRI, draw esr, crp. Serum pregnancy test noted positive hover patient reports LMP >7years ago, denies vaginal bleeding or discharge. Upreg negative. patient placed in soft collar, reports feeling nauseous after eating an apple. Zofran and IV fluids ordered.

## 2022-10-10 NOTE — ED PROVIDER NOTE - NS ED ROS FT
Review of Systems:  CONSTITUTIONAL: (-)fever, (-)chills  SKIN: (-)rash  EYES: (-) eye pain, (-) vision changes  ENT: (-) rhinorrhea, (-) congestion, (+) sore throat  RESPIRATORY: (-) shortness of breath, (-) cough  CARDIAC: (-) chest pain, (-) palpitations  GI: (-) abdominal pain, (+) nausea, (+) vomiting, (-) diarrhea, (-) constipation, (-) melena (-) hematochezia  : (-) dysuria, (-) frequency, (-) hematuria  NEUROLOGIC: (-) numbness or tingling, (-) focal weakness, (-) headache, (-) dizziness  All other systems negative, unless specified in HPI

## 2022-10-11 LAB
ALBUMIN SERPL ELPH-MCNC: 4.4 G/DL — SIGNIFICANT CHANGE UP (ref 3.5–5.2)
ALP SERPL-CCNC: 98 U/L — SIGNIFICANT CHANGE UP (ref 30–115)
ALT FLD-CCNC: 72 U/L — HIGH (ref 0–41)
ANION GAP SERPL CALC-SCNC: 16 MMOL/L — HIGH (ref 7–14)
AST SERPL-CCNC: 34 U/L — SIGNIFICANT CHANGE UP (ref 0–41)
BASOPHILS # BLD AUTO: 0.03 K/UL — SIGNIFICANT CHANGE UP (ref 0–0.2)
BASOPHILS NFR BLD AUTO: 0.3 % — SIGNIFICANT CHANGE UP (ref 0–1)
BILIRUB SERPL-MCNC: 0.6 MG/DL — SIGNIFICANT CHANGE UP (ref 0.2–1.2)
BUN SERPL-MCNC: 8 MG/DL — LOW (ref 10–20)
CALCIUM SERPL-MCNC: 8.9 MG/DL — SIGNIFICANT CHANGE UP (ref 8.4–10.4)
CHLORIDE SERPL-SCNC: 96 MMOL/L — LOW (ref 98–110)
CO2 SERPL-SCNC: 25 MMOL/L — SIGNIFICANT CHANGE UP (ref 17–32)
CREAT SERPL-MCNC: 0.6 MG/DL — LOW (ref 0.7–1.5)
CRP SERPL-MCNC: 41.3 MG/L — HIGH
EGFR: 105 ML/MIN/1.73M2 — SIGNIFICANT CHANGE UP
EOSINOPHIL # BLD AUTO: 0.01 K/UL — SIGNIFICANT CHANGE UP (ref 0–0.7)
EOSINOPHIL NFR BLD AUTO: 0.1 % — SIGNIFICANT CHANGE UP (ref 0–8)
ERYTHROCYTE [SEDIMENTATION RATE] IN BLOOD: 92 MM/HR — HIGH (ref 0–20)
FERRITIN SERPL-MCNC: 195 NG/ML — HIGH (ref 15–150)
GLUCOSE SERPL-MCNC: 107 MG/DL — HIGH (ref 70–99)
HCG SERPL-ACNC: 9.4 MIU/ML — SIGNIFICANT CHANGE UP
HCT VFR BLD CALC: 34.6 % — LOW (ref 37–47)
HGB BLD-MCNC: 12 G/DL — SIGNIFICANT CHANGE UP (ref 12–16)
IMM GRANULOCYTES NFR BLD AUTO: 0.5 % — HIGH (ref 0.1–0.3)
IRON SATN MFR SERPL: 16 % — SIGNIFICANT CHANGE UP (ref 15–50)
IRON SATN MFR SERPL: 42 UG/DL — SIGNIFICANT CHANGE UP (ref 35–150)
LYMPHOCYTES # BLD AUTO: 1.68 K/UL — SIGNIFICANT CHANGE UP (ref 1.2–3.4)
LYMPHOCYTES # BLD AUTO: 19.1 % — LOW (ref 20.5–51.1)
MAGNESIUM SERPL-MCNC: 1.7 MG/DL — LOW (ref 1.8–2.4)
MCHC RBC-ENTMCNC: 28.9 PG — SIGNIFICANT CHANGE UP (ref 27–31)
MCHC RBC-ENTMCNC: 34.7 G/DL — SIGNIFICANT CHANGE UP (ref 32–37)
MCV RBC AUTO: 83.4 FL — SIGNIFICANT CHANGE UP (ref 81–99)
MONOCYTES # BLD AUTO: 0.57 K/UL — SIGNIFICANT CHANGE UP (ref 0.1–0.6)
MONOCYTES NFR BLD AUTO: 6.5 % — SIGNIFICANT CHANGE UP (ref 1.7–9.3)
NEUTROPHILS # BLD AUTO: 6.45 K/UL — SIGNIFICANT CHANGE UP (ref 1.4–6.5)
NEUTROPHILS NFR BLD AUTO: 73.5 % — SIGNIFICANT CHANGE UP (ref 42.2–75.2)
NRBC # BLD: 0 /100 WBCS — SIGNIFICANT CHANGE UP (ref 0–0)
PLATELET # BLD AUTO: 238 K/UL — SIGNIFICANT CHANGE UP (ref 130–400)
POTASSIUM SERPL-MCNC: 3.5 MMOL/L — SIGNIFICANT CHANGE UP (ref 3.5–5)
POTASSIUM SERPL-SCNC: 3.5 MMOL/L — SIGNIFICANT CHANGE UP (ref 3.5–5)
PROT SERPL-MCNC: 7.1 G/DL — SIGNIFICANT CHANGE UP (ref 6–8)
RBC # BLD: 4.1 M/UL — LOW (ref 4.2–5.4)
RBC # BLD: 4.15 M/UL — LOW (ref 4.2–5.4)
RBC # FLD: 12.6 % — SIGNIFICANT CHANGE UP (ref 11.5–14.5)
RETICS #: 59 K/UL — SIGNIFICANT CHANGE UP (ref 25–125)
RETICS/RBC NFR: 1.4 % — SIGNIFICANT CHANGE UP (ref 0.5–1.5)
SODIUM SERPL-SCNC: 137 MMOL/L — SIGNIFICANT CHANGE UP (ref 135–146)
TIBC SERPL-MCNC: 268 UG/DL — SIGNIFICANT CHANGE UP (ref 220–430)
UIBC SERPL-MCNC: 226 UG/DL — SIGNIFICANT CHANGE UP (ref 110–370)
WBC # BLD: 8.78 K/UL — SIGNIFICANT CHANGE UP (ref 4.8–10.8)
WBC # FLD AUTO: 8.78 K/UL — SIGNIFICANT CHANGE UP (ref 4.8–10.8)

## 2022-10-11 PROCEDURE — 72156 MRI NECK SPINE W/O & W/DYE: CPT | Mod: 26

## 2022-10-11 PROCEDURE — 99232 SBSQ HOSP IP/OBS MODERATE 35: CPT

## 2022-10-11 PROCEDURE — 99222 1ST HOSP IP/OBS MODERATE 55: CPT

## 2022-10-11 RX ORDER — PANTOPRAZOLE SODIUM 20 MG/1
40 TABLET, DELAYED RELEASE ORAL
Refills: 0 | Status: DISCONTINUED | OUTPATIENT
Start: 2022-10-11 | End: 2022-10-14

## 2022-10-11 RX ORDER — IBUPROFEN 200 MG
400 TABLET ORAL ONCE
Refills: 0 | Status: COMPLETED | OUTPATIENT
Start: 2022-10-11 | End: 2022-10-11

## 2022-10-11 RX ORDER — MAGNESIUM SULFATE 500 MG/ML
2 VIAL (ML) INJECTION ONCE
Refills: 0 | Status: COMPLETED | OUTPATIENT
Start: 2022-10-11 | End: 2022-10-11

## 2022-10-11 RX ORDER — IBUPROFEN 200 MG
400 TABLET ORAL
Refills: 0 | Status: DISCONTINUED | OUTPATIENT
Start: 2022-10-11 | End: 2022-10-14

## 2022-10-11 RX ADMIN — Medication 400 MILLIGRAM(S): at 21:11

## 2022-10-11 RX ADMIN — Medication 650 MILLIGRAM(S): at 12:08

## 2022-10-11 RX ADMIN — ENOXAPARIN SODIUM 40 MILLIGRAM(S): 100 INJECTION SUBCUTANEOUS at 05:40

## 2022-10-11 RX ADMIN — Medication 25 GRAM(S): at 12:12

## 2022-10-11 RX ADMIN — Medication 650 MILLIGRAM(S): at 05:43

## 2022-10-11 RX ADMIN — PANTOPRAZOLE SODIUM 40 MILLIGRAM(S): 20 TABLET, DELAYED RELEASE ORAL at 14:09

## 2022-10-11 RX ADMIN — Medication 650 MILLIGRAM(S): at 17:35

## 2022-10-11 RX ADMIN — Medication 650 MILLIGRAM(S): at 23:01

## 2022-10-11 RX ADMIN — Medication 400 MILLIGRAM(S): at 14:10

## 2022-10-11 NOTE — CONSULT NOTE ADULT - ASSESSMENT
58 yo female patient with no PMHx presenting with neck pain x 2 days. CT and MR neck show anterior dens calcification (calcium hydroxyapatite deposition), erosion, prevertebral edema with calcific tendinitis of longus colli muscle.      #Pseudogout/calcium hydroxyapatite deposition disease    Patient reports history of "frozen shoulder" of b/l shoulders at different times and gets PT. No other joint pain, swelling, or stiffness.  Would continue with ibuprofen for now  If symptoms don't improve can start prednisone 40 mg daily until improvement of symptoms 56 yo female patient with no PMHx presenting with neck pain x 2 days. CT and MR neck show anterior dens calcification (calcium hydroxyapatite deposition), erosion, prevertebral edema with calcific tendinitis of longus colli muscle.      #calcium hydroxyapatite deposition disease/Pseudogout    Patient reports history of "frozen shoulder" of b/l shoulders at different times and gets PT. No other joint pain, swelling, or stiffness.  Would continue with ibuprofen for now  If symptoms don't improve can start prednisone 40 mg daily until improvement of symptoms

## 2022-10-11 NOTE — PATIENT PROFILE ADULT - VISION (WITH CORRECTIVE LENSES IF THE PATIENT USUALLY WEARS THEM):
[facility] [address] [place] [address]  Phone: [phone]  Fax: [phone]    September 21, 2021        Michelle Ramirez  [address] [place] [address]          To whom it may concern:    RE: Michelle Ramirez    Patient was seen and treated today at our clinic and missed work.  Patient may return to work *** with the following:  {No Restrictions or Light Duty List:811083}    Please contact me for questions or concerns.      Sincerely,        Benita Kay Behm, MD   Normal vision: sees adequately in most situations; can see medication labels, newsprint

## 2022-10-11 NOTE — PATIENT PROFILE ADULT - PATIENT REPRESENTATIVE: ( YOU CAN CHOOSE ANY PERSON THAT CAN ASSIST YOU WITH YOUR HEALTH CARE PREFERENCES, DOES NOT HAVE TO BE A SPOUSE, IMMEDIATE FAMILY OR SIGNIFICANT OTHER/PARTNER)
"Pharmacy Consult  -  Warfarin    Bjorn Pollock is a 83 yo man admitted 7/17/17 for cough and dyspnea. He is on warfarin for chronic atrial fibrillation.  Pharmacy Service was asked to dose and monitor his warfarin therapy during this hospitalization.    Height - 72\" (182.9 cm)  Weight - 229 lb 11.2 oz (104 kg)    Consulting Provider: - Hospitalist Group  Indication: - AFib  Goal INR: -  2-3  Home Regimen:   - 2mg daily   - 14mg weekly    Bridge Therapy: No    Drug-Drug Interactions with current regimen:   Aspirin--increased risk of bleeding              Doxycycline--may increase warfarin sensitivity    Warfarin Dosing During Admission:    Date  7/16 7/17 7/18 7/19 7/20 7/21 7/22 7/23 7/24   INR  2.96 2.78 2.9 2.7 2.69 2.58 2.25 1.77 1.66   Dose  (home) 2mg 2mg 2mg 2mg  2mg 2mg 3mg 3mg      Date  7/25 7/26 7/27 7/28 7/29 7/30 7/31 8/1 8/2    INR  1.64 1.64  1.60  1.65  1.87  1.85  2.16  2.08 1.98    Dose  3mg 4mg  4 mg  5 mg  4mg  5mg  2 mg  4 mg (5mg)     Labs:    Results from last 7 days   Lab Units 08/02/17  0341 08/01/17  0449 07/31/17  0409 07/30/17  0530 07/29/17  0528 07/28/17  0510 07/27/17  0400   INR  1.98 2.08 2.16 1.85 1.87 1.65 1.60   HEMOGLOBIN g/dL 11.3* --  --  --  --  --  --    HEMATOCRIT % 35.7* --  --  --  --  --  --    PLATELETS 10*3/mm3 119* --  --  --  --  --  --      Results from last 7 days   Lab Units 08/02/17  0341   SODIUM mmol/L 136   POTASSIUM mmol/L 4.1   CHLORIDE mmol/L 99   CO2 mmol/L 30.0   BUN mg/dL 23   CREATININE mg/dL 0.90   CALCIUM mg/dL 9.1   GLUCOSE mg/dL 201*     Current dietary intake: 100% of meals (Regular Cardiac, Consistent Carbs)    Assessment/Plan:   1.  Warfarin 5mg po x 1.  Resume warfarin 4mg daily starting tomorrow.  2.  Daily PT/INR.  Monitor for S/Sx of bleeding/clotting.  3.  Pharmacy will continue to follow and adjust dose based on INR trend.     Thanks,  Jevon Ivy, PharmD, BCPS  #[id number]  08/02/17  8:22 AM    " yes

## 2022-10-11 NOTE — PATIENT PROFILE ADULT - PATIENT'S GENDER IDENTITY
AMBERLY ESPITIA  [id number]    HISTORY OF PRESENT ILLNESS:  47y male PMHx SLE on prednisone and plaquenil, asthma, hypothyroidism, HTN presenting with worsening SOB, ongoing cough, chest wall and rib pain.   Of note, pt had recent admission for presumed asthma exacerbation and costochondritis 2/2 RSV. Pt has had this back/chest pain over the past month, radiating from the lumbar region anteriorly along his ribs. Pain is relieved with leaning forward and with flexeril and percocet and worsened with sudden movement as well as cough. Pt's cough has been present since prior admission and is associated with yellow phlegm. Pt describes SOB as chest tightness and feels that his symptoms have overall have stayed stable post-discharge for his most recent admission. Additionally, pt takes furosemide for lupus associated lower extremity edema, though he notes over the past 3 days, he has voided a subjectively low amount compared to his normal. Pt denies fevers, chills, n/v, diarrhea, other locations of joint tenderness.      admission 6/27- 7/11, evaluated by Rheumatology   follows with Dr. Neri   SLE with positive serologies (+OWEN 1:2560, + SM, + RNP, + LA, Low complement). The patient has no history of nephritis, and has mostly MSK and cutaneous manifestations  he was admitted with asthma exacerbation 2/2 parainfluenza + entero/rhinovirus on high dose steroids on 6/26.   home medications: MMF 3 pills daily, HCQ 400mg daily, Saphnelo (first infusion 6/2/22), medrol 16mg daily   due for 2nd infusion on 7/28      CXR: neg pleural effusion    consider TTE?         PAST MEDICAL & SURGICAL HISTORY:  Lupus      Asthma      Hypothyroid      History of cholecystectomy          Review of Systems:  Gen:  No fevers/chills, weight loss  HEENT: No blurry vision, no difficulty swallowing, no oral or nasal ulcers  CVS: No chest pain/palpitations  Resp: No SOB/wheezing  GI: No N/V/C/D/abdominal pain  MSK:  Skin: No new rashes  Neuro: No headaches    MEDICATIONS  (STANDING):  albuterol/ipratropium for Nebulization 3 milliLiter(s) Nebulizer every 6 hours  aspirin enteric coated 81 milliGRAM(s) Oral daily  benzonatate 200 milliGRAM(s) Oral every 8 hours  budesonide 160 MICROgram(s)/formoterol 4.5 MICROgram(s) Inhaler 2 Puff(s) Inhalation two times a day  dextrose 5%. 1000 milliLiter(s) (100 mL/Hr) IV Continuous <Continuous>  dextrose 5%. 1000 milliLiter(s) (50 mL/Hr) IV Continuous <Continuous>  dextrose 50% Injectable 25 Gram(s) IV Push once  dextrose 50% Injectable 12.5 Gram(s) IV Push once  dextrose 50% Injectable 25 Gram(s) IV Push once  enoxaparin Injectable 40 milliGRAM(s) SubCutaneous every 24 hours  glucagon  Injectable 1 milliGRAM(s) IntraMuscular once  hydroxychloroquine 400 milliGRAM(s) Oral daily  insulin lispro (ADMELOG) corrective regimen sliding scale   SubCutaneous three times a day before meals  insulin lispro (ADMELOG) corrective regimen sliding scale   SubCutaneous at bedtime  levothyroxine 125 MICROGram(s) Oral daily  losartan 50 milliGRAM(s) Oral daily  mycophenolate mofetil 500 milliGRAM(s) Oral three times a day  pantoprazole    Tablet 40 milliGRAM(s) Oral before breakfast  predniSONE   Tablet 30 milliGRAM(s) Oral daily  trimethoprim  160 mG/sulfamethoxazole 800 mG 1 Tablet(s) Oral <User Schedule>    MEDICATIONS  (PRN):  dextrose Oral Gel 15 Gram(s) Oral once PRN Blood Glucose LESS THAN 70 milliGRAM(s)/deciliter  morphine  - Injectable 4 milliGRAM(s) IV Push every 4 hours PRN Severe Pain (7 - 10)  oxycodone    5 mG/acetaminophen 325 mG 2 Tablet(s) Oral every 6 hours PRN Moderate Pain (4 - 6)      Allergies    No Known Allergies    Intolerances        PERTINENT MEDICATION HISTORY:    SOCIAL HISTORY:  OCCUPATION:  TRAVEL HISTORY:    FAMILY HISTORY:      Vital Signs Last 24 Hrs  T(C): 36.4 (21 Jul 2022 08:42), Max: 37.1 (21 Jul 2022 01:21)  T(F): 97.5 (21 Jul 2022 08:42), Max: 98.7 (21 Jul 2022 01:21)  HR: 92 (21 Jul 2022 08:42) (92 - 106)  BP: 136/90 (21 Jul 2022 08:42) (126/90 - 145/90)  BP(mean): --  RR: 17 (21 Jul 2022 08:42) (17 - 22)  SpO2: 97% (21 Jul 2022 08:42) (94% - 99%)    Parameters below as of 21 Jul 2022 08:42  Patient On (Oxygen Delivery Method): room air        Physical Exam:  General: No apparent distress  HEENT: EOMI, MMM  CVS: +S1/S2, RRR, no murmurs/rubs/gallops  Resp: CTA b/l. No crackles/wheezing  GI: Soft, NT/ND +BS  MSK:  Neuro: AAOx3  Skin: no visible rashes    LABS:                        12.7   7.53  )-----------( 294      ( 21 Jul 2022 11:13 )             37.6     07-21    138  |  99  |  11  ----------------------------<  155<H>  4.1   |  25  |  0.95    Ca    9.7      21 Jul 2022 11:13  Phos  2.7     07-21  Mg     2.1     07-21    TPro  6.3  /  Alb  3.9  /  TBili  0.4  /  DBili  x   /  AST  33  /  ALT  83<H>  /  AlkPhos  97  07-20    PT/INR - ( 20 Jul 2022 15:08 )   PT: 11.9 sec;   INR: 1.03 ratio         PTT - ( 20 Jul 2022 15:08 )  PTT:27.2 sec      RADIOLOGY & ADDITIONAL STUDIES:     AMBERLY ESPITIA  [id number]    HISTORY OF PRESENT ILLNESS:  47y male PMHx SLE on chronic steroid and plaquenil, asthma, hypothyroidism, HTN presenting with worsening SOB, ongoing cough, chest wall and rib pain. tested + RSV this admission    Of note, pt had recent admission for presumed asthma exacerbation 2/2 parainfluenza + entero/rhinovirus.  Pt has had this back/chest pain over the past month, radiating from the lumbar region anteriorly along his ribs.   Pain is relieved with leaning forward and with flexeril and percocet and worsened with sudden movement as well as cough.   Pt's cough has been present since prior admission and is associated with yellow phlegm. Pt describes SOB as chest tightness and feels that his symptoms have overall have stayed stable post-discharge for his most recent admission. Additionally, pt takes furosemide for lupus associated lower extremity edema, though he notes over the past 3 days, he has voided a subjectively low amount compared to his normal. Pt denies fevers, chills, n/v, diarrhea, other locations of joint tenderness.      admission 6/27- 7/11, evaluated by Rheumatology   follows with Dr. Neri   SLE with positive serologies (+OWEN 1:2560, + SM, + RNP, + LA, Low complement). The patient has no history of nephritis, and has mostly MSK and cutaneous manifestations  he was admitted with asthma exacerbation 2/2 parainfluenza + entero/rhinovirus on high dose steroids on 6/26.   home medications: MMF 3 pills daily, HCQ 400mg daily, Saphnelo (first infusion 6/2/22), medrol 16mg daily   due for 2nd infusion on 7/28      CXR: neg pleural effusion    consider TTE?         PAST MEDICAL & SURGICAL HISTORY:  Lupus      Asthma      Hypothyroid      History of cholecystectomy          Review of Systems:  Gen:  No fevers/chills, weight loss  HEENT: No blurry vision, no difficulty swallowing, no oral or nasal ulcers  CVS: No chest pain/palpitations  Resp: No SOB/wheezing  GI: No N/V/C/D/abdominal pain  MSK:  Skin: No new rashes  Neuro: No headaches    MEDICATIONS  (STANDING):  albuterol/ipratropium for Nebulization 3 milliLiter(s) Nebulizer every 6 hours  aspirin enteric coated 81 milliGRAM(s) Oral daily  benzonatate 200 milliGRAM(s) Oral every 8 hours  budesonide 160 MICROgram(s)/formoterol 4.5 MICROgram(s) Inhaler 2 Puff(s) Inhalation two times a day  dextrose 5%. 1000 milliLiter(s) (100 mL/Hr) IV Continuous <Continuous>  dextrose 5%. 1000 milliLiter(s) (50 mL/Hr) IV Continuous <Continuous>  dextrose 50% Injectable 25 Gram(s) IV Push once  dextrose 50% Injectable 12.5 Gram(s) IV Push once  dextrose 50% Injectable 25 Gram(s) IV Push once  enoxaparin Injectable 40 milliGRAM(s) SubCutaneous every 24 hours  glucagon  Injectable 1 milliGRAM(s) IntraMuscular once  hydroxychloroquine 400 milliGRAM(s) Oral daily  insulin lispro (ADMELOG) corrective regimen sliding scale   SubCutaneous three times a day before meals  insulin lispro (ADMELOG) corrective regimen sliding scale   SubCutaneous at bedtime  levothyroxine 125 MICROGram(s) Oral daily  losartan 50 milliGRAM(s) Oral daily  mycophenolate mofetil 500 milliGRAM(s) Oral three times a day  pantoprazole    Tablet 40 milliGRAM(s) Oral before breakfast  predniSONE   Tablet 30 milliGRAM(s) Oral daily  trimethoprim  160 mG/sulfamethoxazole 800 mG 1 Tablet(s) Oral <User Schedule>    MEDICATIONS  (PRN):  dextrose Oral Gel 15 Gram(s) Oral once PRN Blood Glucose LESS THAN 70 milliGRAM(s)/deciliter  morphine  - Injectable 4 milliGRAM(s) IV Push every 4 hours PRN Severe Pain (7 - 10)  oxycodone    5 mG/acetaminophen 325 mG 2 Tablet(s) Oral every 6 hours PRN Moderate Pain (4 - 6)      Allergies    No Known Allergies    Intolerances        PERTINENT MEDICATION HISTORY:    SOCIAL HISTORY:  OCCUPATION:  TRAVEL HISTORY:    FAMILY HISTORY:      Vital Signs Last 24 Hrs  T(C): 36.4 (21 Jul 2022 08:42), Max: 37.1 (21 Jul 2022 01:21)  T(F): 97.5 (21 Jul 2022 08:42), Max: 98.7 (21 Jul 2022 01:21)  HR: 92 (21 Jul 2022 08:42) (92 - 106)  BP: 136/90 (21 Jul 2022 08:42) (126/90 - 145/90)  BP(mean): --  RR: 17 (21 Jul 2022 08:42) (17 - 22)  SpO2: 97% (21 Jul 2022 08:42) (94% - 99%)    Parameters below as of 21 Jul 2022 08:42  Patient On (Oxygen Delivery Method): room air        Physical Exam:  General: No apparent distress  HEENT: EOMI, MMM  CVS: +S1/S2, RRR, no murmurs/rubs/gallops  Resp: CTA b/l. No crackles/wheezing  GI: Soft, NT/ND +BS  MSK:  Neuro: AAOx3  Skin: no visible rashes    LABS:                        12.7   7.53  )-----------( 294      ( 21 Jul 2022 11:13 )             37.6     07-21    138  |  99  |  11  ----------------------------<  155<H>  4.1   |  25  |  0.95    Ca    9.7      21 Jul 2022 11:13  Phos  2.7     07-21  Mg     2.1     07-21    TPro  6.3  /  Alb  3.9  /  TBili  0.4  /  DBili  x   /  AST  33  /  ALT  83<H>  /  AlkPhos  97  07-20    PT/INR - ( 20 Jul 2022 15:08 )   PT: 11.9 sec;   INR: 1.03 ratio         PTT - ( 20 Jul 2022 15:08 )  PTT:27.2 sec      RADIOLOGY & ADDITIONAL STUDIES:     AMBERLY ESPITIA  [id number]    HISTORY OF PRESENT ILLNESS:  47y male PMHx SLE on chronic steroid and plaquenil, asthma, hypothyroidism, HTN presenting with worsening SOB, ongoing cough, chest wall and rib pain  x 7 days. tested + RSV this admission    Of note, pt had recent admission for presumed asthma exacerbation 2/2 parainfluenza + entero/rhinovirus.  Pt has had this back/chest pain over the past month, radiating from the lumbar region anteriorly along his ribs.   Pain is relieved with leaning forward and with flexeril and percocet and worsened with sudden movement as well as cough.   Pt's cough has been present since prior admission and is associated with yellow phlegm. Pt describes SOB as chest tightness and feels that his symptoms have overall have stayed stable post-discharge for his most recent admission. Additionally, pt takes furosemide for lupus associated lower extremity edema, though he notes over the past 3 days, he has voided a subjectively low amount compared to his normal. Pt denies fevers, chills, n/v, diarrhea, other locations of joint tenderness.      during admission 6/27- 7/11, pt evaluated by Rheumatology   follows with Dr. Neri  outpt   SLE with positive serologies (+OWEN 1:2560, + SM, + RNP, + LA, Low complement). The patient has no history of nephritis, and has mostly MSK and cutaneous manifestations  he was admitted with asthma exacerbation 2/2 parainfluenza + entero/rhinovirus on high dose steroids on 6/26.   home medications: MMF 3 pills daily, HCQ 400mg daily, Saphnelo (first infusion 6/2/22), medrol 16mg daily   due for 2nd Saphnelo infusion on 7/28    C3, C4, wnl, Urine P/Cr wnl.   Cellcept held before resuming at discharged, discharged to home on prednisone 30mg qD       PAST MEDICAL & SURGICAL HISTORY:  Lupus  Asthma  Hypothyroid   History of cholecystectomy    Review of Systems:  Gen:  No fevers/chills, weight loss  HEENT: No blurry vision, no difficulty swallowing, no oral or nasal ulcers  CVS: See HPI   Resp: See HPI   GI: No N/V/C/D/abdominal pain  MSK: no joint swelling/pain     Skin: No new rashes  Neuro: No headaches    MEDICATIONS  (STANDING):  albuterol/ipratropium for Nebulization 3 milliLiter(s) Nebulizer every 6 hours  aspirin enteric coated 81 milliGRAM(s) Oral daily  benzonatate 200 milliGRAM(s) Oral every 8 hours  budesonide 160 MICROgram(s)/formoterol 4.5 MICROgram(s) Inhaler 2 Puff(s) Inhalation two times a day  dextrose 5%. 1000 milliLiter(s) (100 mL/Hr) IV Continuous <Continuous>  dextrose 5%. 1000 milliLiter(s) (50 mL/Hr) IV Continuous <Continuous>  dextrose 50% Injectable 25 Gram(s) IV Push once  dextrose 50% Injectable 12.5 Gram(s) IV Push once  dextrose 50% Injectable 25 Gram(s) IV Push once  enoxaparin Injectable 40 milliGRAM(s) SubCutaneous every 24 hours  glucagon  Injectable 1 milliGRAM(s) IntraMuscular once  hydroxychloroquine 400 milliGRAM(s) Oral daily  insulin lispro (ADMELOG) corrective regimen sliding scale   SubCutaneous three times a day before meals  insulin lispro (ADMELOG) corrective regimen sliding scale   SubCutaneous at bedtime  levothyroxine 125 MICROGram(s) Oral daily  losartan 50 milliGRAM(s) Oral daily  mycophenolate mofetil 500 milliGRAM(s) Oral three times a day  pantoprazole    Tablet 40 milliGRAM(s) Oral before breakfast  predniSONE   Tablet 30 milliGRAM(s) Oral daily  trimethoprim  160 mG/sulfamethoxazole 800 mG 1 Tablet(s) Oral <User Schedule>    MEDICATIONS  (PRN):  dextrose Oral Gel 15 Gram(s) Oral once PRN Blood Glucose LESS THAN 70 milliGRAM(s)/deciliter  morphine  - Injectable 4 milliGRAM(s) IV Push every 4 hours PRN Severe Pain (7 - 10)  oxycodone    5 mG/acetaminophen 325 mG 2 Tablet(s) Oral every 6 hours PRN Moderate Pain (4 - 6)      Allergies  No Known Allergies  Intolerances    PERTINENT MEDICATION HISTORY:    SOCIAL HISTORY:  OCCUPATION:  TRAVEL HISTORY:    FAMILY HISTORY:      Vital Signs Last 24 Hrs  T(C): 36.4 (21 Jul 2022 08:42), Max: 37.1 (21 Jul 2022 01:21)  T(F): 97.5 (21 Jul 2022 08:42), Max: 98.7 (21 Jul 2022 01:21)  HR: 92 (21 Jul 2022 08:42) (92 - 106)  BP: 136/90 (21 Jul 2022 08:42) (126/90 - 145/90)  BP(mean): --  RR: 17 (21 Jul 2022 08:42) (17 - 22)  SpO2: 97% (21 Jul 2022 08:42) (94% - 99%)    Parameters below as of 21 Jul 2022 08:42  Patient On (Oxygen Delivery Method): room air    Physical Exam:  General: NAD  HEENT: EOMI, MMM  CVS: +S1/S2, RRR, no murmurs/rubs/gallops  Resp: + b/l expiratory wheezing    GI: Soft, NT/ND +BS  MSK: no synovitis, joints NTP    Neuro: AAOx3  Skin: no visible rashes  Ext: 2+ pitting edema up to knee     LABS:                        12.7   7.53  )-----------( 294      ( 21 Jul 2022 11:13 )             37.6     07-21    138  |  99  |  11  ----------------------------<  155<H>  4.1   |  25  |  0.95    Ca    9.7      21 Jul 2022 11:13  Phos  2.7     07-21  Mg     2.1     07-21    TPro  6.3  /  Alb  3.9  /  TBili  0.4  /  DBili  x   /  AST  33  /  ALT  83<H>  /  AlkPhos  97  07-20  PT/INR - ( 20 Jul 2022 15:08 )   PT: 11.9 sec;   INR: 1.03 ratio    PTT - ( 20 Jul 2022 15:08 )  PTT:27.2 sec      RADIOLOGY & ADDITIONAL STUDIES:     Female

## 2022-10-11 NOTE — CONSULT NOTE ADULT - ATTENDING COMMENTS
57 year old female who presented for symptoms of left sided neck pain. Pain was abrupt and started at 7pm Sunday night. Symptoms were constant, severe, no relief with ibuprofen and naproxen and associated with odynophagia and limited range of motion of the neck. She reports in the past having symptoms of neck pains but episodes were not as severe. Denied headaches, fevers, visual changes, paresthesias. Denies any symptoms of peripheral arthritis or joint pains, joint stiffness or swollen joints. She has a history of recently diagnosed right frozen shoulder that's getting better with PT, and a history of L frozen shoulder 2-3 years ago with unclear etiology.  Labs revealed elevated inflammatory markers and + ALT. CT neck raised concern for calcific tendinitis of the longus colli muscle with amorphous calcification in midline prevertebral space C1-2 with prevertebral edema, erosion of the anterior dens, and a 1.1 cm L thyroid nodule.  MRI C spine suggested erosion of the dens with adjacent calcification, likely secondary to hydroxyapatite crystal deposition, and findings of calcific tendinitis of longus coli muscle, along with degenerative changes. 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 Pain was abrupt and started at 7pm Sunday night. Symptoms were constant, severe, no relief with ibuprofen and naproxen and associated with odynophagia and limited range of motion of the neck. She reports in the past having symptoms of neck pains but episodes were not as severe. Denied headaches, fevers, visual changes, paresthesias. Denies any symptoms of peripheral arthritis or joint pains, joint stiffness or swollen joints. She has a history of recently diagnosed right frozen shoulder that's getting better with PT, and a history of L frozen shoulder 2-3 years ago with unclear etiology.  Labs revealed elevated inflammatory markers and + ALT. CT neck raised concern for calcific tendinitis of the longus colli muscle with amorphous calcification in midline prevertebral space C1-2 with prevertebral edema, erosion of the anterior dens, and a 1.1 cm L thyroid nodule.  MRI C spine suggested erosion of the dens with adjacent calcification, likely secondary to hydroxyapatite crystal deposition, and findings of calcific tendinitis of longus coli muscle, along with degenerative changes. 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 Pain was abrupt and started at 7pm Sunday night. Symptoms were constant, severe, no relief with ibuprofen and naproxen and associated with odynophagia and limited range of motion of the neck. She reports in the past having symptoms of neck pains but episodes were not as severe. Denied headaches, fevers, visual changes, paresthesias. Denies any symptoms of peripheral arthritis or joint pains, joint stiffness or swollen joints. She has a history of recently diagnosed right frozen shoulder that's getting better with PT, and a history of L frozen shoulder 2-3 years ago with unclear etiology.  Labs revealed elevated inflammatory markers and + ALT. CT neck raised concern for calcific tendinitis of the longus colli muscle with amorphous calcification in midline prevertebral space C1-2 with prevertebral edema, erosion of the anterior dens, and a 1.1 cm L thyroid nodule.  MRI C spine suggested erosion of the dens with adjacent calcification, likely secondary to calcium hydroxyapatite crystal deposition, and findings of calcific tendinitis of longus coli muscle, along with degenerative changes. 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

## 2022-10-11 NOTE — CONSULT NOTE ADULT - SUBJECTIVE AND OBJECTIVE BOX
CYNTHIA ALANIZ  [id number]  Female  57y  HPI:  56 yo female patient with no PMHx presenting for the above chief complaint. neck started yesterday morning. it was mild in severity and intensifies until late night to become unbearable. patient mentioned pain in back of her neck, 10/10 in severity, constant, non radiating, described as "nerve damage pain), not relieved with anything in particular, exacerbated with movement. she mentioned dysphagia to her own saliva associated with pain in the same location but denied fever, chills, SOB, cough, rhinorrhea, chest pain, abdominal pain, urine or bowel changes.    VITALS:   T(C): 36.4 (10-10-22 @ 13:01), Max: 36.4 (10-10-22 @ 13:01)  HR: 99 (10-10-22 @ 13:01) (99 - 99)  BP: 151/82 (10-10-22 @ 13:01) (151/82 - 151/82)  RR: 17 (10-10-22 @ 13:01) (17 - 17)  SpO2: 98% (10-10-22 @ 13:01) (98% - 98%)    in ED, vitals were WNL. labs were significant for anemia (Hb 11), transaminitis (AST 85 ALT 95) and a positive serum pregnancy test. CT neck was done and showed Amorphous calcifications in the midline prevertebral space at C1-2 with probable trace prevertebral edema (partially obscured by artifact). Findings are suggestive of calcific tendinitis of the longus colli muscle. Apparent mild cortical erosion along the anterior aspect of the dens, unusual for calcific tendinitis. Given this as well as the streak artifact from dental hardware limiting evaluation of this region, MRI cervical spine may be obtained for further evaluation. neurosurgery were consulted and recommended MRI. patient admitted to medicine for further investigation   (10 Oct 2022 20:50)    PAST MEDICAL & SURGICAL HISTORY:  No pertinent past medical history      No significant past surgical history        FAMILY HISTORY:    MEDICATIONS  (STANDING):  acetaminophen     Tablet .. 650 milliGRAM(s) Oral every 6 hours  enoxaparin Injectable 40 milliGRAM(s) SubCutaneous every 24 hours  ibuprofen  Tablet. 400 milliGRAM(s) Oral <User Schedule>  pantoprazole    Tablet 40 milliGRAM(s) Oral before breakfast    MEDICATIONS  (PRN):  aluminum hydroxide/magnesium hydroxide/simethicone Suspension 30 milliLiter(s) Oral every 4 hours PRN Dyspepsia  melatonin 3 milliGRAM(s) Oral at bedtime PRN Insomnia  methocarbamol 750 milliGRAM(s) Oral three times a day PRN Muscle Spasm  morphine  - Injectable 2 milliGRAM(s) IV Push every 4 hours PRN Severe Pain (7 - 10)  ondansetron Injectable 4 milliGRAM(s) IV Push every 8 hours PRN Nausea and/or Vomiting    Allergies    No Known Allergies    Intolerances      REVIEW OF SYSTEMS    CONSTITUTIONAL: No weakness, fevers  EYES/ENT: No visual changes;+ throat pain with swallowing  NECK: + neck pain, no stiffness   RESPIRATORY: No cough, no shortness of breath  CARDIOVASCULAR: No chest pain  GASTROINTESTINAL: No abdominal pain  NEUROLOGICAL: No numbness or weakness  MSK: no joint pain, swelling, stiffness    PHYSICAL EXAM:  General: NAD  Mental status: AAOx3  Head&neck:  In cervical collar  Eyes: EOMI, conjunctiva and sclera clear  ENT: Moist mucous membranes  Chest/lung: Clear to auscultation bilaterally; No wheezing or crackles  Heart: Regular rate and rhythm; No murmurs, rubs, or gallops  Abdomen: Bowel sounds present; Soft, Nontender, Nondistended  Extremities:  No edema or cyanosis. No joint swelling, erythema, or tenderness          CBC Full  -  ( 11 Oct 2022 09:10 )  WBC Count : x  RBC Count : 4.10 M/uL  Hemoglobin : x  Hematocrit : x  Platelet Count - Automated : x  Mean Cell Volume : x  Mean Cell Hemoglobin : x  Mean Cell Hemoglobin Concentration : x  Auto Neutrophil # : x  Auto Lymphocyte # : x  Auto Monocyte # : x  Auto Eosinophil # : x  Auto Basophil # : x  Auto Neutrophil % : x  Auto Lymphocyte % : x  Auto Monocyte % : x  Auto Eosinophil % : x  Auto Basophil % : x    LIVER FUNCTIONS - ( 11 Oct 2022 08:44 )  Alb: 4.4 g/dL / Pro: 7.1 g/dL / ALK PHOS: 98 U/L / ALT: 72 U/L / AST: 34 U/L / GGT: x           10-11    137  |  96<L>  |  8<L>  ----------------------------<  107<H>  3.5   |  25  |  0.6<L>    Ca    8.9      11 Oct 2022 08:44  Mg     1.7     10-11    TPro  7.1  /  Alb  4.4  /  TBili  0.6  /  DBili  x   /  AST  34  /  ALT  72<H>  /  AlkPhos  98  10-11

## 2022-10-11 NOTE — CHART NOTE - NSCHARTNOTEFT_GEN_A_CORE
Patient had MRI today.  < from: MR Cervical Spine w/wo IV Cont (10.11.22 @ 10:15) >    IMPRESSION:    1.  Findings consistent with calcific tendinitis of the longus coli   muscle with bony erosion of the anterior den likely secondary to adjacent   calcification (hydroxyapatite crystal deposition).    2.  Straightening of the normal cervical lordosis may be secondary to   patient positioning or muscle spasm.    3.  C3-C4, C4-C5, C6-C7, and C7-T1; disc osteophyte complexes with   compression of the thecal sac.    4.  C5-C6; disc osteophyte complex with compression of the thecal sac,   degenerative changes, and bilateral foraminal narrowing.    < end of copied text >    Imaging reviewed by Dr Lynn.  There is no evidence of mass, infection or cord compression.  There is no surgical pathology.  No neurosurgical intervention at this time.  Would recommend Physical therapy and pain management if needed.  Pt can wear a soft collar for comfort.  No need for a rigid cervical collar.  Above discussed with Dr Lynn.

## 2022-10-12 DIAGNOSIS — M65.20 CALCIFIC TENDINITIS, UNSPECIFIED SITE: ICD-10-CM

## 2022-10-12 PROBLEM — Z00.00 ENCOUNTER FOR PREVENTIVE HEALTH EXAMINATION: Status: ACTIVE | Noted: 2022-10-12

## 2022-10-12 LAB
24R-OH-CALCIDIOL SERPL-MCNC: 24 NG/ML — LOW (ref 30–80)
ALBUMIN SERPL ELPH-MCNC: 4.3 G/DL — SIGNIFICANT CHANGE UP (ref 3.5–5.2)
ALP SERPL-CCNC: 93 U/L — SIGNIFICANT CHANGE UP (ref 30–115)
ALT FLD-CCNC: 61 U/L — HIGH (ref 0–41)
ANION GAP SERPL CALC-SCNC: 12 MMOL/L — SIGNIFICANT CHANGE UP (ref 7–14)
AST SERPL-CCNC: 32 U/L — SIGNIFICANT CHANGE UP (ref 0–41)
BASOPHILS # BLD AUTO: 0.03 K/UL — SIGNIFICANT CHANGE UP (ref 0–0.2)
BASOPHILS NFR BLD AUTO: 0.5 % — SIGNIFICANT CHANGE UP (ref 0–1)
BILIRUB SERPL-MCNC: 0.5 MG/DL — SIGNIFICANT CHANGE UP (ref 0.2–1.2)
BUN SERPL-MCNC: 10 MG/DL — SIGNIFICANT CHANGE UP (ref 10–20)
CALCIUM SERPL-MCNC: 8.8 MG/DL — SIGNIFICANT CHANGE UP (ref 8.4–10.5)
CALCIUM SERPL-MCNC: 8.9 MG/DL — SIGNIFICANT CHANGE UP (ref 8.4–10.5)
CHLORIDE SERPL-SCNC: 100 MMOL/L — SIGNIFICANT CHANGE UP (ref 98–110)
CHOLEST SERPL-MCNC: 296 MG/DL — HIGH
CO2 SERPL-SCNC: 26 MMOL/L — SIGNIFICANT CHANGE UP (ref 17–32)
CREAT SERPL-MCNC: 0.7 MG/DL — SIGNIFICANT CHANGE UP (ref 0.7–1.5)
EGFR: 101 ML/MIN/1.73M2 — SIGNIFICANT CHANGE UP
EOSINOPHIL # BLD AUTO: 0.09 K/UL — SIGNIFICANT CHANGE UP (ref 0–0.7)
EOSINOPHIL NFR BLD AUTO: 1.5 % — SIGNIFICANT CHANGE UP (ref 0–8)
FOLATE SERPL-MCNC: 10.8 NG/ML — SIGNIFICANT CHANGE UP
GLUCOSE SERPL-MCNC: 105 MG/DL — HIGH (ref 70–99)
HCT VFR BLD CALC: 33.4 % — LOW (ref 37–47)
HDLC SERPL-MCNC: 66 MG/DL — SIGNIFICANT CHANGE UP
HGB BLD-MCNC: 10.9 G/DL — LOW (ref 12–16)
IMM GRANULOCYTES NFR BLD AUTO: 0.2 % — SIGNIFICANT CHANGE UP (ref 0.1–0.3)
IRON SATN MFR SERPL: 24 % — SIGNIFICANT CHANGE UP (ref 15–50)
IRON SATN MFR SERPL: 60 UG/DL — SIGNIFICANT CHANGE UP (ref 35–150)
LIPID PNL WITH DIRECT LDL SERPL: 207 MG/DL — HIGH
LYMPHOCYTES # BLD AUTO: 1.99 K/UL — SIGNIFICANT CHANGE UP (ref 1.2–3.4)
LYMPHOCYTES # BLD AUTO: 33.4 % — SIGNIFICANT CHANGE UP (ref 20.5–51.1)
MAGNESIUM SERPL-MCNC: 2.1 MG/DL — SIGNIFICANT CHANGE UP (ref 1.8–2.4)
MCHC RBC-ENTMCNC: 27.9 PG — SIGNIFICANT CHANGE UP (ref 27–31)
MCHC RBC-ENTMCNC: 32.6 G/DL — SIGNIFICANT CHANGE UP (ref 32–37)
MCV RBC AUTO: 85.6 FL — SIGNIFICANT CHANGE UP (ref 81–99)
MONOCYTES # BLD AUTO: 0.51 K/UL — SIGNIFICANT CHANGE UP (ref 0.1–0.6)
MONOCYTES NFR BLD AUTO: 8.6 % — SIGNIFICANT CHANGE UP (ref 1.7–9.3)
NEUTROPHILS # BLD AUTO: 3.33 K/UL — SIGNIFICANT CHANGE UP (ref 1.4–6.5)
NEUTROPHILS NFR BLD AUTO: 55.8 % — SIGNIFICANT CHANGE UP (ref 42.2–75.2)
NON HDL CHOLESTEROL: 230 MG/DL — HIGH
NRBC # BLD: 0 /100 WBCS — SIGNIFICANT CHANGE UP (ref 0–0)
PLATELET # BLD AUTO: 245 K/UL — SIGNIFICANT CHANGE UP (ref 130–400)
POTASSIUM SERPL-MCNC: 3.8 MMOL/L — SIGNIFICANT CHANGE UP (ref 3.5–5)
POTASSIUM SERPL-SCNC: 3.8 MMOL/L — SIGNIFICANT CHANGE UP (ref 3.5–5)
PROCALCITONIN SERPL-MCNC: 0.04 NG/ML — SIGNIFICANT CHANGE UP (ref 0.02–0.1)
PROT SERPL-MCNC: 6.9 G/DL — SIGNIFICANT CHANGE UP (ref 6–8)
PTH-INTACT FLD-MCNC: 44 PG/ML — SIGNIFICANT CHANGE UP (ref 15–65)
RBC # BLD: 3.9 M/UL — LOW (ref 4.2–5.4)
RBC # FLD: 12.7 % — SIGNIFICANT CHANGE UP (ref 11.5–14.5)
RHEUMATOID FACT SERPL-ACNC: 11 IU/ML — SIGNIFICANT CHANGE UP (ref 0–13)
SODIUM SERPL-SCNC: 138 MMOL/L — SIGNIFICANT CHANGE UP (ref 135–146)
TIBC SERPL-MCNC: 255 UG/DL — SIGNIFICANT CHANGE UP (ref 220–430)
TRIGL SERPL-MCNC: 113 MG/DL — SIGNIFICANT CHANGE UP
TSH SERPL-MCNC: 3.65 UIU/ML — SIGNIFICANT CHANGE UP (ref 0.27–4.2)
UIBC SERPL-MCNC: 195 UG/DL — SIGNIFICANT CHANGE UP (ref 110–370)
VIT B12 SERPL-MCNC: 754 PG/ML — SIGNIFICANT CHANGE UP (ref 232–1245)
WBC # BLD: 5.96 K/UL — SIGNIFICANT CHANGE UP (ref 4.8–10.8)
WBC # FLD AUTO: 5.96 K/UL — SIGNIFICANT CHANGE UP (ref 4.8–10.8)

## 2022-10-12 PROCEDURE — 99233 SBSQ HOSP IP/OBS HIGH 50: CPT

## 2022-10-12 RX ADMIN — METHOCARBAMOL 750 MILLIGRAM(S): 500 TABLET, FILM COATED ORAL at 05:52

## 2022-10-12 RX ADMIN — METHOCARBAMOL 750 MILLIGRAM(S): 500 TABLET, FILM COATED ORAL at 17:26

## 2022-10-12 RX ADMIN — Medication 650 MILLIGRAM(S): at 05:26

## 2022-10-12 RX ADMIN — MORPHINE SULFATE 2 MILLIGRAM(S): 50 CAPSULE, EXTENDED RELEASE ORAL at 08:01

## 2022-10-12 RX ADMIN — Medication 650 MILLIGRAM(S): at 11:31

## 2022-10-12 RX ADMIN — Medication 650 MILLIGRAM(S): at 17:26

## 2022-10-12 RX ADMIN — PANTOPRAZOLE SODIUM 40 MILLIGRAM(S): 20 TABLET, DELAYED RELEASE ORAL at 05:26

## 2022-10-12 RX ADMIN — METHOCARBAMOL 750 MILLIGRAM(S): 500 TABLET, FILM COATED ORAL at 23:04

## 2022-10-12 RX ADMIN — Medication 400 MILLIGRAM(S): at 11:30

## 2022-10-12 RX ADMIN — Medication 40 MILLIGRAM(S): at 10:24

## 2022-10-12 RX ADMIN — Medication 400 MILLIGRAM(S): at 17:26

## 2022-10-12 RX ADMIN — Medication 400 MILLIGRAM(S): at 05:53

## 2022-10-12 RX ADMIN — Medication 650 MILLIGRAM(S): at 23:04

## 2022-10-12 NOTE — PHYSICAL THERAPY INITIAL EVALUATION ADULT - IMPAIRMENTS CONTRIBUTING TO GAIT DEVIATIONS, PT EVAL
+muscle guarding noted, decreased neck ROM, + soft cervical collar/impaired balance/pain/decreased strength

## 2022-10-12 NOTE — PHYSICAL THERAPY INITIAL EVALUATION ADULT - PERTINENT HX OF CURRENT PROBLEM, REHAB EVAL
58 yo female patient with no PMHx presenting for the above chief complaint. neck started yesterday morning. it was mild in severity and intensifies until late night to become unbearable. patient mentioned pain in back of her neck, 10/10 in severity, constant, non radiating, described as "nerve damage pain), not relieved with anything in particular, exacerbated with movement. she mentioned dysphagia to her own saliva associated with pain in the same location but denied fever, chills, SOB, cough, rhinorrhea, chest pain, abdominal pain, urine or bowel changes.

## 2022-10-12 NOTE — PHYSICAL THERAPY INITIAL EVALUATION ADULT - GENERAL OBSERVATIONS, REHAB EVAL
11:00-11:35. Pt encountered semifowler ~ 60-70 degrees in bed in NAD, + soft cervical collar,  complaints of 9/10 pain, stated  9/10 pain after morphine and steroids. Pt was agreeable to PT.

## 2022-10-12 NOTE — PHYSICAL THERAPY INITIAL EVALUATION ADULT - RANGE OF MOTION EXAMINATION, REHAB EVAL
R UE 3/4 range AAROM. L UE AAROM 3/4 range shoulder flex . B LE's grossly WFL. Pt with + soft cervical collar donned

## 2022-10-12 NOTE — PROGRESS NOTE ADULT - ASSESSMENT
58 yo female patient with no PMHx presenting for neck pain and found to have calcific tendinitis of the longus colli muscle and mild cortical erosion along the anterior aspect of the dens. patient admitted for further investigation    # Neck pain  # Calcific tendinitis of the longus colli  # Cortical erosion of dens  - start ibuprofen 400 mg TID after meals, PPI daily  - c/w morphine prn, robaxin  - rheumatology evaluation, blood work as per rheumatology    # Left thyroid Nodule  - CT neck -> 1.1 cm left thyroid nodule  - thyroid sonogram on outpatient basis    # Positive serum pregnancy test, postmenopausal  - beta HCG quantitative negative  - consider obgyn consult if positive    # Transaminitis likely due to hemolyzed blood sample  - repeat normal    # Normocytic anemia  - f/u iron studies and ferritin    # DVT prophylaxis - on lovenox subcut  # GI prophylaxis - on protonix  # Code status - Full Code  
56 yo female patient with no PMHx presenting for neck pain and found to have calcific tendinitis of the longus colli muscle and mild cortical erosion along the anterior aspect of the dens. patient admitted for further investigation    # Neck pain due to  Calcific tendinitis   # Cortical erosion of dens  - start ibuprofen 400 mg TID after meals, PPI daily  - rheumatology evaluation, blood work as per rheumatology  - starting prednisone.    # Left thyroid Nodule  - CT neck -> 1.1 cm left thyroid nodule  - thyroid sonogram on outpatient basis    # Positive serum pregnancy test, postmenopausal  - beta HCG quantitative negative    # Transaminitis likely due to hemolyzed blood sample  - repeat normal    # Normocytic anemia  - f/u iron studies and ferritin    # DVT prophylaxis - on lovenox subcut  # GI prophylaxis - on protonix  # Code status - Full Code    #Progress Note Handoff  Pending (specify): pain mgmt /PT   Family discussion:  Disposition: Home

## 2022-10-12 NOTE — PROGRESS NOTE ADULT - SUBJECTIVE AND OBJECTIVE BOX
CYNTHIA ALANIZ  57y  Female      Patient is a 57y old  Female who presents with a chief complaint of neck pain.      INTERVAL HPI/OVERNIGHT EVENTS:      ******************************* REVIEW OF SYSTEMS:**********************************************    All other review of systems negative    *********************** VITALS ******************************************    T(F): 97.2 (10-12-22 @ 05:00)  HR: 82 (10-12-22 @ 05:00) (82 - 87)  BP: 140/72 (10-12-22 @ 05:00) (135/81 - 140/72)  RR: 18 (10-12-22 @ 05:00) (18 - 18)  SpO2: 97% (10-11-22 @ 21:09) (95% - 97%)            ******************************** PHYSICAL EXAM:**************************************************  GENERAL: NAD    PSYCH: no agitation, baseline mentation  HEENT: C collar in place     NERVOUS SYSTEM:  Alert & Oriented X3,     PULMONARY: MELLISSA, CTA    CARDIOVASCULAR: S1S2 RRR    GI: Soft, NT, ND; BS present.    EXTREMITIES:  2+ Peripheral Pulses, No clubbing, cyanosis, or edema    LYMPH: No lymphadenopathy noted    SKIN: No rashes or lesions      **************************** LABS *******************************************************                          10.9   5.96  )-----------( 245      ( 12 Oct 2022 06:20 )             33.4     10-12    138  |  100  |  10  ----------------------------<  105<H>  3.8   |  26  |  0.7    Ca    8.8      12 Oct 2022 06:20  Mg     2.1     10-12    TPro  6.9  /  Alb  4.3  /  TBili  0.5  /  DBili  x   /  AST  32  /  ALT  61<H>  /  AlkPhos  93  10-12          Lactate Trend        CAPILLARY BLOOD GLUCOSE              **************************Active Medications *******************************************  No Known Allergies      acetaminophen     Tablet .. 650 milliGRAM(s) Oral every 6 hours  aluminum hydroxide/magnesium hydroxide/simethicone Suspension 30 milliLiter(s) Oral every 4 hours PRN  enoxaparin Injectable 40 milliGRAM(s) SubCutaneous every 24 hours  ibuprofen  Tablet. 400 milliGRAM(s) Oral <User Schedule>  melatonin 3 milliGRAM(s) Oral at bedtime PRN  methocarbamol 750 milliGRAM(s) Oral three times a day PRN  ondansetron Injectable 4 milliGRAM(s) IV Push every 8 hours PRN  pantoprazole    Tablet 40 milliGRAM(s) Oral before breakfast  predniSONE   Tablet 40 milliGRAM(s) Oral daily      ***************************************************  RADIOLOGY & ADDITIONAL TESTS:    Imaging Personally Reviewed:  [ ] YES  [ ] NO    HEALTH ISSUES - PROBLEM Dx:      
  CYNTHIA ALANIZ  57y Female    Patient is a 57y old  Female who presents with a chief complaint of neck pain    INTERVAL HPI/OVERNIGHT EVENTS:    ROS: Pt still complains of significant neck pain. Pt also reports left sided neck pain on swallowing. Pt denies fever, chills, SOB, palpitations, nausea, vomiting, abdominal pain, dysuria, diarrhea, constipation, rash.     Overnight events: No acute events overnight.    Vital Signs Last 24 Hrs  T(C): 37 (11 Oct 2022 15:11), Max: 37 (11 Oct 2022 15:11)  T(F): 98.6 (11 Oct 2022 15:11), Max: 98.6 (11 Oct 2022 15:11)  HR: 86 (11 Oct 2022 15:11) (86 - 101)  BP: 136/73 (11 Oct 2022 15:11) (134/67 - 160/95)  BP(mean): 99 (11 Oct 2022 15:11) (99 - 99)  RR: 18 (11 Oct 2022 15:11) (18 - 18)  SpO2: 95% (11 Oct 2022 15:11) (95% - 98%)    Parameters below as of 11 Oct 2022 15:11  Patient On (Oxygen Delivery Method): room air    No Known Allergies    MEDICATIONS  (STANDING):  acetaminophen     Tablet .. 650 milliGRAM(s) Oral every 6 hours  enoxaparin Injectable 40 milliGRAM(s) SubCutaneous every 24 hours  ibuprofen  Tablet. 400 milliGRAM(s) Oral <User Schedule>  pantoprazole    Tablet 40 milliGRAM(s) Oral before breakfast    MEDICATIONS  (PRN):  aluminum hydroxide/magnesium hydroxide/simethicone Suspension 30 milliLiter(s) Oral every 4 hours PRN Dyspepsia  melatonin 3 milliGRAM(s) Oral at bedtime PRN Insomnia  methocarbamol 750 milliGRAM(s) Oral three times a day PRN Muscle Spasm  morphine  - Injectable 2 milliGRAM(s) IV Push every 4 hours PRN Severe Pain (7 - 10)  ondansetron Injectable 4 milliGRAM(s) IV Push every 8 hours PRN Nausea and/or Vomiting      PHYSICAL EXAM:  General Appearance: NAD, normal for age and gender. 	  Neck: Normal JVP, no bruit. In neck collar.   Eyes: Conjunctiva clear, Extra Ocular muscles intact. No scleral icterus.  ENMT: Moist oral mucosa. No oral lesion.  Cardiovascular: Regular rate and rhythm S1 S2, No JVD, No murmurs.  Respiratory: Lungs clear to auscultation. No wheezes, rales or rhonchi.  Psychiatry: Alert and oriented x 3. Anxious appearing.  Gastrointestinal:  Soft, Non-tender, Non-distended.  Neurologic: Non-focal deficits.  Musculoskeletal/ extremities: Normal range of motion, No clubbing, cyanosis or edema.  Vascular: Peripheral pulses palpable bilaterally.  Skin/Integumen: No rashes, No ecchymoses, No cyanosis.    LABS:                        12.0   8.78  )-----------( 238      ( 11 Oct 2022 08:44 )             34.6     10-11    137  |  96<L>  |  8<L>  ----------------------------<  107<H>  3.5   |  25  |  0.6<L>    Ca    8.9      11 Oct 2022 08:44  Mg     1.7     10-11    TPro  7.1  /  Alb  4.4  /  TBili  0.6  /  DBili  x   /  AST  34  /  ALT  72<H>  /  AlkPhos  98  10-11          RADIOLOGY & ADDITIONAL TESTS:  < from: MR Cervical Spine w/wo IV Cont (10.11.22 @ 10:15) >  IMPRESSION:    1.  Findings consistent with calcific tendinitis of the longus coli   muscle with bony erosion of the anterior den likely secondary to adjacent   calcification (hydroxyapatite crystal deposition).    2.  Straightening of the normal cervical lordosis may be secondary to   patient positioning or muscle spasm.    3.  C3-C4, C4-C5, C6-C7, and C7-T1; disc osteophyte complexes with   compression of the thecal sac.    4.  C5-C6; disc osteophyte complex with compression of the thecal sac,   degenerative changes, and bilateral foraminal narrowing.                
INTERNAL MEDICINE PROGRESS NOTE  CYNTHIA ALANIZ 57y Female  MRN#: [id number]     Hospital Day: 2d  Pt is currently admitted with the primary diagnosis of     SUBJECTIVE  Overnight events   none    Subjective complaints  Patient was evaluated this morning. Reports 10/10 neck pain, states that morphine never improved the pain.  Denies headache, changes to vision, or UE/LE motor/sensation.  Placed Pain medicine consult.                                            OBJECTIVE  PAST MEDICAL & SURGICAL HISTORY  No pertinent past medical history    No significant past surgical history                                                ALLERGIES:  No Known Allergies                           HOME MEDICATIONS  Home Medications:                           MEDICATIONS:  STANDING MEDICATIONS  acetaminophen     Tablet .. 650 milliGRAM(s) Oral every 6 hours  enoxaparin Injectable 40 milliGRAM(s) SubCutaneous every 24 hours  ibuprofen  Tablet. 400 milliGRAM(s) Oral <User Schedule>  pantoprazole    Tablet 40 milliGRAM(s) Oral before breakfast  predniSONE   Tablet 40 milliGRAM(s) Oral daily    PRN MEDICATIONS  aluminum hydroxide/magnesium hydroxide/simethicone Suspension 30 milliLiter(s) Oral every 4 hours PRN  melatonin 3 milliGRAM(s) Oral at bedtime PRN  methocarbamol 750 milliGRAM(s) Oral three times a day PRN  ondansetron Injectable 4 milliGRAM(s) IV Push every 8 hours PRN                                            ------------------------------------------------------------  VITAL SIGNS: Last 24 Hours  T(C): 36.3 (12 Oct 2022 13:15), Max: 36.3 (12 Oct 2022 13:15)  T(F): 97.3 (12 Oct 2022 13:15), Max: 97.3 (12 Oct 2022 13:15)  HR: 88 (12 Oct 2022 13:15) (82 - 88)  BP: 124/70 (12 Oct 2022 13:15) (124/70 - 140/72)  BP(mean): --  RR: 18 (12 Oct 2022 13:15) (18 - 18)  SpO2: 97% (11 Oct 2022 21:09) (97% - 97%)                                               LABS:                        10.9   5.96  )-----------( 245      ( 12 Oct 2022 06:20 )             33.4     10-12    138  |  100  |  10  ----------------------------<  105<H>  3.8   |  26  |  0.7    Ca    8.8      12 Oct 2022 06:20  Mg     2.1     10-12    TPro  6.9  /  Alb  4.3  /  TBili  0.5  /  DBili  x   /  AST  32  /  ALT  61<H>  /  AlkPhos  93  10-12                                                              RADIOLOGY:    PHYSICAL EXAM:  GENERAL: NAD, lying in bed with foam neck brace  HEAD:  Atraumatic, Normocephalic  EYES: EOMI, PERRLA, conjunctiva and sclera clear  CHEST/LUNG: Clear to auscultation bilaterally;  HEART: Regular rate and rhythm; No murmurs, rubs, or gallops  ABDOMEN: Soft, nontender, nondistended  EXTREMITIES:  2+ Peripheral Pulses, brisk capillary refill. No clubbing, cyanosis, or edema  NERVOUS SYSTEM:  A&Ox3, no focal deficits   SKIN: No rashes or lesions                                       ASSESSMENT & PLAN  CYNTHIA ALANIZ is a 57y Female with a history significant for presenting for neck pain and found to have calcific tendinitis of the longus colli muscle and mild cortical erosion along the anterior aspect of the dens. patient admitted for further investigation. Pain remains uncontrolled. Pending Pain management follow up.     # Neck pain due to  Calcific tendinitis   # Cortical erosion of dens  - start ibuprofen 400 mg TID after meals, PPI daily  - rheumatology evaluation, blood work as per rheumatology  - starting prednisone.  - f/u pain management    # Left thyroid Nodule  - CT neck -> 1.1 cm left thyroid nodule  - thyroid sonogram on outpatient basis    # Positive serum pregnancy test, postmenopausal  - beta HCG quantitative negative    # Transaminitis likely due to hemolyzed blood sample  - repeat normal    # Normocytic anemia  - f/u iron studies and ferritin    # DVT prophylaxis - on lovenox subcut  # GI prophylaxis - on protonix  # Code status - Full Code    #Progress Note Handoff  Pending (specify): pain mgmt /PT   Family discussion:  Disposition: Home

## 2022-10-12 NOTE — CONSULT NOTE ADULT - SUBJECTIVE AND OBJECTIVE BOX
PAIN MANAGEMENT CONSULT NOTE    Chief Complaint:    HPI:  56 yo female patient with no PMHx presenting for the above chief complaint. neck started yesterday morning. it was mild in severity and intensifies until late night to become unbearable. patient mentioned pain in back of her neck, 10/10 in severity, constant, non radiating, described as "nerve damage pain), not relieved with anything in particular, exacerbated with movement. she mentioned dysphagia to her own saliva associated with pain in the same location but denied fever, chills, SOB, cough, rhinorrhea, chest pain, abdominal pain, urine or bowel changes.    VITALS:   T(C): 36.4 (10-10-22 @ 13:01), Max: 36.4 (10-10-22 @ 13:01)  HR: 99 (10-10-22 @ 13:01) (99 - 99)  BP: 151/82 (10-10-22 @ 13:01) (151/82 - 151/82)  RR: 17 (10-10-22 @ 13:01) (17 - 17)  SpO2: 98% (10-10-22 @ 13:01) (98% - 98%)    in ED, vitals were WNL. labs were significant for anemia (Hb 11), transaminitis (AST 85 ALT 95) and a positive serum pregnancy test. CT neck was done and showed Amorphous calcifications in the midline prevertebral space at C1-2 with probable trace prevertebral edema (partially obscured by artifact). Findings are suggestive of calcific tendinitis of the longus colli muscle. Apparent mild cortical erosion along the anterior aspect of the dens, unusual for calcific tendinitis. Given this as well as the streak artifact from dental hardware limiting evaluation of this region, MRI cervical spine may be obtained for further evaluation. neurosurgery were consulted and recommended MRI. patient admitted to medicine for further investigation   (10 Oct 2022 20:50)      PAST MEDICAL & SURGICAL HISTORY:  No pertinent past medical history      No significant past surgical history          FAMILY HISTORY:      SOCIAL HISTORY:  [ ] Denies Smoking, Alcohol, or Drug Use    HOME MEDICATIONS:   Please refer to initial HNP    PAIN HOME MEDICATIONS:    Allergies    No Known Allergies    Intolerances        PAIN MEDICATIONS:  acetaminophen     Tablet .. 650 milliGRAM(s) Oral every 6 hours  ibuprofen  Tablet. 400 milliGRAM(s) Oral <User Schedule>  melatonin 3 milliGRAM(s) Oral at bedtime PRN  methocarbamol 750 milliGRAM(s) Oral three times a day PRN  ondansetron Injectable 4 milliGRAM(s) IV Push every 8 hours PRN    Heme:  enoxaparin Injectable 40 milliGRAM(s) SubCutaneous every 24 hours    Antibiotics:    Cardiovascular:    GI:  aluminum hydroxide/magnesium hydroxide/simethicone Suspension 30 milliLiter(s) Oral every 4 hours PRN  pantoprazole    Tablet 40 milliGRAM(s) Oral before breakfast    Endocrine:  predniSONE   Tablet 40 milliGRAM(s) Oral daily    All Other Medications:      Vital Signs Last 24 Hrs  T(C): 36.3 (12 Oct 2022 13:15), Max: 36.3 (12 Oct 2022 13:15)  T(F): 97.3 (12 Oct 2022 13:15), Max: 97.3 (12 Oct 2022 13:15)  HR: 88 (12 Oct 2022 13:15) (82 - 88)  BP: 124/70 (12 Oct 2022 13:15) (124/70 - 140/72)  BP(mean): --  RR: 18 (12 Oct 2022 13:15) (18 - 18)  SpO2: 97% (11 Oct 2022 21:09) (97% - 97%)    Parameters below as of 11 Oct 2022 21:09  Patient On (Oxygen Delivery Method): room air        LABS:                        10.9   5.96  )-----------( 245      ( 12 Oct 2022 06:20 )             33.4     10-12    138  |  100  |  10  ----------------------------<  105<H>  3.8   |  26  |  0.7    Ca    8.8      12 Oct 2022 06:20  Mg     2.1     10-12    TPro  6.9  /  Alb  4.3  /  TBili  0.5  /  DBili  x   /  AST  32  /  ALT  61<H>  /  AlkPhos  93  10-12          RADIOLOGY:  CT neck:  IMPRESSION:    1.  Findings consistent with calcific tendinitis of the longus coli   muscle with bony erosion of the anterior den likely secondary to adjacent   calcification (hydroxyapatite crystal deposition).    2.  Straightening of the normal cervical lordosis may be secondary to   patient positioning or muscle spasm.    3.  C3-C4, C4-C5, C6-C7, and C7-T1; disc osteophyte complexes with   compression of the thecal sac.    4.  C5-C6; disc osteophyte complex with compression of the thecal sac,   degenerative changes, and bilateral foraminal narrowing.        REVIEW OF SYSTEMS:  CONSTITUTIONAL: No fever or fatigue O/N.   EYES: No eye pain, visual disturbances  ENMT:  No difficulty hearing. +painful swallowing, neck brace immobilizer on  RESPIRATORY: No cough, wheezing; No shortness of breath  CARDIOVASCULAR: No chest pain, palpitations.   GASTROINTESTINAL: No bowel movements. No abdominal or epigastric pain. No nausea, vomiting.   GENITOURINARY: No dysuria, frequency, or incontinence  NEUROLOGICAL: No headaches, loss of strength, numbness, or tremors. No dizzinesss or lightheadedness with pain medications.   MUSCULOSKELETAL: Neck pain left sided, No joint pain or swelling, pain with movement in all planes flexion/extension/rotation b/l      PAIN ASSESSMENT:    PHYSICAL EXAM  GENERAL: Laying in bed, NAD  Neuro: CN II-XII PERRRL, EOMI  Cranial nerves grossly intact  Motor exam: 5/5 b/l UE & LE  Sensation intact to LT in UE/LE in 3 dermatomes  CHEST/LUNG: Clear to auscultation bilaterally  HEART: Regular rate and rhythm  ABDOMEN: Soft, Nontender, Nondistended; Bowel sounds present  EXTREMITIES:  2+ Peripheral Pulses, No clubbing, cyanosis, or edema  SKIN: No rashes or lesions      ASSESSMENT:   Patient with sudden onset severe neck pain, left sided that is described as electrical, sharp, and constant.  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 Symptoms typically resolve within a few weeks.   She is taking prednisone, which will be more effective and efficient in reducing symptoms than NSAIDs and can be continued to finish the taper  f/u inflammatory markers & rheum w/u as this can be caused by rheumatological conditions  Crowned dens syndrome is also a rare calcium pyrophosphate deposition disease, showing calcification of the cruciform ligament and is usually seen in elderly patients as opposed to calcific tendinitis of the longus colli, which is seen in more middle aged individuals (20-50)

## 2022-10-13 ENCOUNTER — TRANSCRIPTION ENCOUNTER (OUTPATIENT)
Age: 57
End: 2022-10-13

## 2022-10-13 LAB
ALBUMIN SERPL ELPH-MCNC: 4.2 G/DL — SIGNIFICANT CHANGE UP (ref 3.5–5.2)
ALP SERPL-CCNC: 93 U/L — SIGNIFICANT CHANGE UP (ref 30–115)
ALT FLD-CCNC: 70 U/L — HIGH (ref 0–41)
ANA TITR SER: NEGATIVE — SIGNIFICANT CHANGE UP
ANION GAP SERPL CALC-SCNC: 11 MMOL/L — SIGNIFICANT CHANGE UP (ref 7–14)
AST SERPL-CCNC: 39 U/L — SIGNIFICANT CHANGE UP (ref 0–41)
BASOPHILS # BLD AUTO: 0.02 K/UL — SIGNIFICANT CHANGE UP (ref 0–0.2)
BASOPHILS NFR BLD AUTO: 0.3 % — SIGNIFICANT CHANGE UP (ref 0–1)
BILIRUB SERPL-MCNC: 0.2 MG/DL — SIGNIFICANT CHANGE UP (ref 0.2–1.2)
BUN SERPL-MCNC: 15 MG/DL — SIGNIFICANT CHANGE UP (ref 10–20)
CALCIUM SERPL-MCNC: 8.5 MG/DL — SIGNIFICANT CHANGE UP (ref 8.4–10.5)
CCP IGG SERPL-ACNC: <8 UNITS — SIGNIFICANT CHANGE UP
CHLORIDE SERPL-SCNC: 100 MMOL/L — SIGNIFICANT CHANGE UP (ref 98–110)
CO2 SERPL-SCNC: 27 MMOL/L — SIGNIFICANT CHANGE UP (ref 17–32)
CREAT SERPL-MCNC: 0.7 MG/DL — SIGNIFICANT CHANGE UP (ref 0.7–1.5)
EGFR: 101 ML/MIN/1.73M2 — SIGNIFICANT CHANGE UP
EOSINOPHIL # BLD AUTO: 0.02 K/UL — SIGNIFICANT CHANGE UP (ref 0–0.7)
EOSINOPHIL NFR BLD AUTO: 0.3 % — SIGNIFICANT CHANGE UP (ref 0–8)
GLUCOSE SERPL-MCNC: 107 MG/DL — HIGH (ref 70–99)
HCT VFR BLD CALC: 30 % — LOW (ref 37–47)
HGB BLD-MCNC: 10 G/DL — LOW (ref 12–16)
IMM GRANULOCYTES NFR BLD AUTO: 0.4 % — HIGH (ref 0.1–0.3)
LYMPHOCYTES # BLD AUTO: 1.92 K/UL — SIGNIFICANT CHANGE UP (ref 1.2–3.4)
LYMPHOCYTES # BLD AUTO: 24.2 % — SIGNIFICANT CHANGE UP (ref 20.5–51.1)
MAGNESIUM SERPL-MCNC: 1.9 MG/DL — SIGNIFICANT CHANGE UP (ref 1.8–2.4)
MCHC RBC-ENTMCNC: 28.7 PG — SIGNIFICANT CHANGE UP (ref 27–31)
MCHC RBC-ENTMCNC: 33.3 G/DL — SIGNIFICANT CHANGE UP (ref 32–37)
MCV RBC AUTO: 86 FL — SIGNIFICANT CHANGE UP (ref 81–99)
MONOCYTES # BLD AUTO: 0.62 K/UL — HIGH (ref 0.1–0.6)
MONOCYTES NFR BLD AUTO: 7.8 % — SIGNIFICANT CHANGE UP (ref 1.7–9.3)
NEUTROPHILS # BLD AUTO: 5.31 K/UL — SIGNIFICANT CHANGE UP (ref 1.4–6.5)
NEUTROPHILS NFR BLD AUTO: 67 % — SIGNIFICANT CHANGE UP (ref 42.2–75.2)
NRBC # BLD: 0 /100 WBCS — SIGNIFICANT CHANGE UP (ref 0–0)
PLATELET # BLD AUTO: 253 K/UL — SIGNIFICANT CHANGE UP (ref 130–400)
POTASSIUM SERPL-MCNC: 3.8 MMOL/L — SIGNIFICANT CHANGE UP (ref 3.5–5)
POTASSIUM SERPL-SCNC: 3.8 MMOL/L — SIGNIFICANT CHANGE UP (ref 3.5–5)
PROT SERPL-MCNC: 6.4 G/DL — SIGNIFICANT CHANGE UP (ref 6–8)
RBC # BLD: 3.49 M/UL — LOW (ref 4.2–5.4)
RBC # FLD: 12.5 % — SIGNIFICANT CHANGE UP (ref 11.5–14.5)
RF+CCP IGG SER-IMP: NEGATIVE — SIGNIFICANT CHANGE UP
SODIUM SERPL-SCNC: 138 MMOL/L — SIGNIFICANT CHANGE UP (ref 135–146)
WBC # BLD: 7.92 K/UL — SIGNIFICANT CHANGE UP (ref 4.8–10.8)
WBC # FLD AUTO: 7.92 K/UL — SIGNIFICANT CHANGE UP (ref 4.8–10.8)

## 2022-10-13 PROCEDURE — 99233 SBSQ HOSP IP/OBS HIGH 50: CPT

## 2022-10-13 RX ADMIN — PANTOPRAZOLE SODIUM 40 MILLIGRAM(S): 20 TABLET, DELAYED RELEASE ORAL at 05:15

## 2022-10-13 RX ADMIN — Medication 400 MILLIGRAM(S): at 05:16

## 2022-10-13 RX ADMIN — Medication 650 MILLIGRAM(S): at 23:17

## 2022-10-13 RX ADMIN — Medication 400 MILLIGRAM(S): at 11:36

## 2022-10-13 RX ADMIN — Medication 40 MILLIGRAM(S): at 05:15

## 2022-10-13 RX ADMIN — Medication 650 MILLIGRAM(S): at 11:22

## 2022-10-13 RX ADMIN — Medication 650 MILLIGRAM(S): at 17:06

## 2022-10-13 RX ADMIN — Medication 400 MILLIGRAM(S): at 17:07

## 2022-10-13 RX ADMIN — Medication 650 MILLIGRAM(S): at 05:15

## 2022-10-13 NOTE — SWALLOW BEDSIDE ASSESSMENT ADULT - CONSISTENCIES ADMINISTERED
3oz water, 4oz regular solids/thin liquid/regular solid
3oz water, 3oz soft & bite sized, 3oz regular solids/thin liquid/regular solid

## 2022-10-13 NOTE — SWALLOW BEDSIDE ASSESSMENT ADULT - SLP GENERAL OBSERVATIONS
Pt. received awake, alert, room air, soft neck collar in place, c/o odynophagia.
Pt. received at bedside, awake, alert, room air, soft neck collar in place, room air.

## 2022-10-13 NOTE — DISCHARGE NOTE PROVIDER - HOSPITAL COURSE
58 yo female patient with no PMHx presenting for the above chief complaint. neck started yesterday morning. it was mild in severity and intensifies until late night to become unbearable. patient mentioned pain in back of her neck, 10/10 in severity, constant, non radiating, described as "nerve damage pain), not relieved with anything in particular, exacerbated with movement. she mentioned dysphagia to her own saliva associated with pain in the same location but denied fever, chills, SOB, cough, rhinorrhea, chest pain, abdominal pain, urine or bowel changes.    VITALS:   T(C): 36.4 (10-10-22 @ 13:01), Max: 36.4 (10-10-22 @ 13:01)  HR: 99 (10-10-22 @ 13:01) (99 - 99)  BP: 151/82 (10-10-22 @ 13:01) (151/82 - 151/82)  RR: 17 (10-10-22 @ 13:01) (17 - 17)  SpO2: 98% (10-10-22 @ 13:01) (98% - 98%)    in ED, vitals were WNL. labs were significant for anemia (Hb 11), transaminitis (AST 85 ALT 95) and a positive serum pregnancy test. CT neck was done and showed Amorphous calcifications in the midline prevertebral space at C1-2 with probable trace prevertebral edema (partially obscured by artifact). Findings are suggestive of calcific tendinitis of the longus colli muscle. Apparent mild cortical erosion along the anterior aspect of the dens, unusual for calcific tendinitis. Given this as well as the streak artifact from dental hardware limiting evaluation of this region, MRI cervical spine may be obtained for further evaluation. neurosurgery were consulted and recommended MRI. patient admitted to medicine for further investigation

## 2022-10-13 NOTE — DISCHARGE NOTE PROVIDER - NSDCCPCAREPLAN_GEN_ALL_CORE_FT
PRINCIPAL DISCHARGE DIAGNOSIS  Diagnosis: Neck pain  Assessment and Plan of Treatment: Calcific Tendonitis is a condition where deposits of calcium build up in a tendon or muscle which can lead to pain and reduced movement.  Take NSAIDs as needed for pain and continue prednisone to reduce inflammation.

## 2022-10-13 NOTE — DISCHARGE NOTE PROVIDER - CARE PROVIDER_API CALL
AMANDA ROMEO  Family Practice  [address]  [place] [address]  Phone: [phone]  Fax: [phone]  Follow Up Time:    AMANDA ROMEO  Family Practice  [address]  [place] [address]  Phone: [phone]  Fax: [phone]  Follow Up Time:     Afua Ferreira (DO)  Internal Medicine; Rheumatology  [address] [place] [address]  Phone: [phone]  Fax: [phone]  Follow Up Time:

## 2022-10-13 NOTE — SWALLOW BEDSIDE ASSESSMENT ADULT - SLP PERTINENT HISTORY OF CURRENT PROBLEM
58 y/o female with no history of trauma, admitted with sudden onset of neck pain and odynophagia. CT neck revealed amorphous calcifications in the midline prevertebral space at C1-2 with probable trace prevertebral edema. Findings suggestive of calcific tendinitis of the longus colli muscle.
56 y/o female with no history of trauma, admitted with sudden onset of neck pain and odynophagia. CT neck revealed amorphous calcifications in the midline prevertebral space at C1-2 with probable trace prevertebral edema. Findings suggestive of calcific tendinitis of the longus colli muscle.

## 2022-10-13 NOTE — SWALLOW BEDSIDE ASSESSMENT ADULT - SWALLOW EVAL: DIAGNOSIS
+ tolerance for soft & bite sized,  regular solids and thin liquids without overt s/s of penetration/ aspiration. Pt. c/o odynophagia on L side on neck.
Pt. continues with +toleration for regular solids w/thin liquids without overt s/s of penetration/ aspiration

## 2022-10-13 NOTE — SWALLOW BEDSIDE ASSESSMENT ADULT - NS SPL SWALLOW CLINIC TRIAL FT
Pt. continues with +toleration for regular solids w/thin liquids without overt s/s of penetration/ aspiration
+ tolerance for soft & bite sized,  regular solids and thin liquids without overt s/s of penetration/ aspiration. Pt. c/o odynophagia on L side on neck.

## 2022-10-13 NOTE — DISCHARGE NOTE PROVIDER - PROVIDER TOKENS
PROVIDER:[TOKEN:[[id number]]] PROVIDER:[TOKEN:[[id number]]],PROVIDER:[TOKEN:[[id number]]]

## 2022-10-13 NOTE — DISCHARGE NOTE PROVIDER - NSDCMRMEDTOKEN_GEN_ALL_CORE_FT
predniSONE 10 mg oral tablet: DAY 1 and DAY 2- 4 tablets  DAY 3 and DAY 4- 3 tablets  DAY 5 and DAY 6- 2 tablets  DAY 7- 1 tablet  then STOP

## 2022-10-14 ENCOUNTER — TRANSCRIPTION ENCOUNTER (OUTPATIENT)
Age: 57
End: 2022-10-14

## 2022-10-14 VITALS
DIASTOLIC BLOOD PRESSURE: 81 MMHG | SYSTOLIC BLOOD PRESSURE: 146 MMHG | RESPIRATION RATE: 18 BRPM | HEART RATE: 60 BPM | TEMPERATURE: 97 F

## 2022-10-14 LAB
ALBUMIN SERPL ELPH-MCNC: 3.9 G/DL — SIGNIFICANT CHANGE UP (ref 3.5–5.2)
ALP SERPL-CCNC: 100 U/L — SIGNIFICANT CHANGE UP (ref 30–115)
ALT FLD-CCNC: 100 U/L — HIGH (ref 0–41)
ANION GAP SERPL CALC-SCNC: 11 MMOL/L — SIGNIFICANT CHANGE UP (ref 7–14)
AST SERPL-CCNC: 60 U/L — HIGH (ref 0–41)
BASOPHILS # BLD AUTO: 0.02 K/UL — SIGNIFICANT CHANGE UP (ref 0–0.2)
BASOPHILS NFR BLD AUTO: 0.3 % — SIGNIFICANT CHANGE UP (ref 0–1)
BILIRUB SERPL-MCNC: <0.2 MG/DL — SIGNIFICANT CHANGE UP (ref 0.2–1.2)
BUN SERPL-MCNC: 18 MG/DL — SIGNIFICANT CHANGE UP (ref 10–20)
CALCIUM SERPL-MCNC: 8.6 MG/DL — SIGNIFICANT CHANGE UP (ref 8.4–10.5)
CHLORIDE SERPL-SCNC: 99 MMOL/L — SIGNIFICANT CHANGE UP (ref 98–110)
CO2 SERPL-SCNC: 25 MMOL/L — SIGNIFICANT CHANGE UP (ref 17–32)
CREAT SERPL-MCNC: 0.7 MG/DL — SIGNIFICANT CHANGE UP (ref 0.7–1.5)
EGFR: 101 ML/MIN/1.73M2 — SIGNIFICANT CHANGE UP
EOSINOPHIL # BLD AUTO: 0.05 K/UL — SIGNIFICANT CHANGE UP (ref 0–0.7)
EOSINOPHIL NFR BLD AUTO: 0.8 % — SIGNIFICANT CHANGE UP (ref 0–8)
GLUCOSE SERPL-MCNC: 90 MG/DL — SIGNIFICANT CHANGE UP (ref 70–99)
HCT VFR BLD CALC: 30.7 % — LOW (ref 37–47)
HGB BLD-MCNC: 10.2 G/DL — LOW (ref 12–16)
IMM GRANULOCYTES NFR BLD AUTO: 0.3 % — SIGNIFICANT CHANGE UP (ref 0.1–0.3)
LYMPHOCYTES # BLD AUTO: 2.52 K/UL — SIGNIFICANT CHANGE UP (ref 1.2–3.4)
LYMPHOCYTES # BLD AUTO: 40.5 % — SIGNIFICANT CHANGE UP (ref 20.5–51.1)
MAGNESIUM SERPL-MCNC: 1.8 MG/DL — SIGNIFICANT CHANGE UP (ref 1.8–2.4)
MCHC RBC-ENTMCNC: 28.9 PG — SIGNIFICANT CHANGE UP (ref 27–31)
MCHC RBC-ENTMCNC: 33.2 G/DL — SIGNIFICANT CHANGE UP (ref 32–37)
MCV RBC AUTO: 87 FL — SIGNIFICANT CHANGE UP (ref 81–99)
MONOCYTES # BLD AUTO: 0.39 K/UL — SIGNIFICANT CHANGE UP (ref 0.1–0.6)
MONOCYTES NFR BLD AUTO: 6.3 % — SIGNIFICANT CHANGE UP (ref 1.7–9.3)
NEUTROPHILS # BLD AUTO: 3.22 K/UL — SIGNIFICANT CHANGE UP (ref 1.4–6.5)
NEUTROPHILS NFR BLD AUTO: 51.8 % — SIGNIFICANT CHANGE UP (ref 42.2–75.2)
NRBC # BLD: 0 /100 WBCS — SIGNIFICANT CHANGE UP (ref 0–0)
PLATELET # BLD AUTO: 253 K/UL — SIGNIFICANT CHANGE UP (ref 130–400)
POTASSIUM SERPL-MCNC: 4.1 MMOL/L — SIGNIFICANT CHANGE UP (ref 3.5–5)
POTASSIUM SERPL-SCNC: 4.1 MMOL/L — SIGNIFICANT CHANGE UP (ref 3.5–5)
PROT SERPL-MCNC: 6.4 G/DL — SIGNIFICANT CHANGE UP (ref 6–8)
RBC # BLD: 3.53 M/UL — LOW (ref 4.2–5.4)
RBC # FLD: 12.6 % — SIGNIFICANT CHANGE UP (ref 11.5–14.5)
SODIUM SERPL-SCNC: 135 MMOL/L — SIGNIFICANT CHANGE UP (ref 135–146)
WBC # BLD: 6.22 K/UL — SIGNIFICANT CHANGE UP (ref 4.8–10.8)
WBC # FLD AUTO: 6.22 K/UL — SIGNIFICANT CHANGE UP (ref 4.8–10.8)

## 2022-10-14 PROCEDURE — 99238 HOSP IP/OBS DSCHRG MGMT 30/<: CPT

## 2022-10-14 RX ADMIN — Medication 400 MILLIGRAM(S): at 06:39

## 2022-10-14 RX ADMIN — ENOXAPARIN SODIUM 40 MILLIGRAM(S): 100 INJECTION SUBCUTANEOUS at 05:39

## 2022-10-14 RX ADMIN — PANTOPRAZOLE SODIUM 40 MILLIGRAM(S): 20 TABLET, DELAYED RELEASE ORAL at 06:39

## 2022-10-14 RX ADMIN — Medication 40 MILLIGRAM(S): at 05:39

## 2022-10-14 RX ADMIN — Medication 650 MILLIGRAM(S): at 05:40

## 2022-10-14 NOTE — OCCUPATIONAL THERAPY INITIAL EVALUATION ADULT - GENERAL OBSERVATIONS, REHAB EVAL
Pt encountered asleep semi reclined in bed ~50*. +soft cervical collar, +IV locked. Easy to way, groggy. Agreeable to OT IE however c/o 8/10 neck pain. Pt transferred to bedside chair however at end of assessment requested to return to bed to rest. Pt left semi reclined in bed with soft cervical collar in place.

## 2022-10-14 NOTE — OCCUPATIONAL THERAPY INITIAL EVALUATION ADULT - RANGE OF MOTION EXAMINATION, UPPER EXTREMITY
pt reports history of frozen shoulder in RUE/bilateral UE Active ROM was WFL  (within functional limits)

## 2022-10-14 NOTE — OCCUPATIONAL THERAPY INITIAL EVALUATION ADULT - PERTINENT HX OF CURRENT PROBLEM, REHAB EVAL
Pt is a right hand dominant female admitted 10/10 with sudden onset of neck pain since night before presentation. Per CT/MRI pt found to have calcific tendonitis of the longus colli muscle and mild cortical erosion of dens. Per neuro surgery soft cervical collar in place.

## 2022-10-14 NOTE — OCCUPATIONAL THERAPY INITIAL EVALUATION ADULT - PLANNED THERAPY INTERVENTIONS, OT EVAL
ADL retraining/balance training/bed mobility training/joint mobilization/neuromuscular re-education/ROM/strengthening/stretching/transfer training

## 2022-10-14 NOTE — DISCHARGE NOTE NURSING/CASE MANAGEMENT/SOCIAL WORK - NSDCPEFALRISK_GEN_ALL_CORE
For information on Fall & Injury Prevention, visit: [email or web address] OR  [email or web address] OR  https://www.cdc.gov/steadi/patient.html

## 2022-10-14 NOTE — DISCHARGE NOTE NURSING/CASE MANAGEMENT/SOCIAL WORK - PATIENT PORTAL LINK FT
You can access the FollowMyHealth Patient Portal offered by [facility] by registering at the following website: [email or web address]. By joining [facility]’s FollowMyHealth portal, you will also be able to view your health information using other applications (apps) compatible with our system.

## 2022-10-17 LAB
CULTURE RESULTS: SIGNIFICANT CHANGE UP
SPECIMEN SOURCE: SIGNIFICANT CHANGE UP

## 2022-10-18 ENCOUNTER — NON-APPOINTMENT (OUTPATIENT)
Age: 57
End: 2022-10-18

## 2022-10-18 PROBLEM — Z78.9 OTHER SPECIFIED HEALTH STATUS: Chronic | Status: ACTIVE | Noted: 2022-10-10

## 2022-10-19 DIAGNOSIS — R60.0 LOCALIZED EDEMA: ICD-10-CM

## 2022-10-19 DIAGNOSIS — E83.42 HYPOMAGNESEMIA: ICD-10-CM

## 2022-10-19 DIAGNOSIS — M11.28: ICD-10-CM

## 2022-10-19 DIAGNOSIS — M54.2 CERVICALGIA: ICD-10-CM

## 2022-10-19 DIAGNOSIS — M65.28 CALCIFIC TENDINITIS, OTHER SITE: ICD-10-CM

## 2022-10-19 DIAGNOSIS — E04.1 NONTOXIC SINGLE THYROID NODULE: ICD-10-CM

## 2022-10-19 DIAGNOSIS — D64.9 ANEMIA, UNSPECIFIED: ICD-10-CM

## 2022-10-20 ENCOUNTER — NON-APPOINTMENT (OUTPATIENT)
Age: 57
End: 2022-10-20

## 2022-10-20 ENCOUNTER — APPOINTMENT (OUTPATIENT)
Dept: RHEUMATOLOGY | Facility: CLINIC | Age: 57
End: 2022-10-20

## 2022-10-20 VITALS
HEART RATE: 87 BPM | DIASTOLIC BLOOD PRESSURE: 77 MMHG | HEIGHT: 62 IN | SYSTOLIC BLOOD PRESSURE: 118 MMHG | WEIGHT: 160 LBS | BODY MASS INDEX: 29.44 KG/M2 | OXYGEN SATURATION: 98 % | TEMPERATURE: 97.9 F

## 2022-10-20 DIAGNOSIS — Z86.39 PERSONAL HISTORY OF OTHER ENDOCRINE, NUTRITIONAL AND METABOLIC DISEASE: ICD-10-CM

## 2022-10-20 DIAGNOSIS — Z80.0 FAMILY HISTORY OF MALIGNANT NEOPLASM OF DIGESTIVE ORGANS: ICD-10-CM

## 2022-10-20 DIAGNOSIS — M54.2 CERVICALGIA: ICD-10-CM

## 2022-10-20 DIAGNOSIS — Z83.3 FAMILY HISTORY OF DIABETES MELLITUS: ICD-10-CM

## 2022-10-20 DIAGNOSIS — Z78.9 OTHER SPECIFIED HEALTH STATUS: ICD-10-CM

## 2022-10-20 PROCEDURE — 99204 OFFICE O/P NEW MOD 45 MIN: CPT

## 2022-10-20 RX ORDER — IBUPROFEN 400 MG/1
400 TABLET, FILM COATED ORAL 3 TIMES DAILY
Qty: 60 | Refills: 0 | Status: ACTIVE | COMMUNITY
Start: 2022-10-20 | End: 1900-01-01

## 2022-10-20 RX ORDER — PREDNISONE 10 MG/1
10 TABLET ORAL
Qty: 20 | Refills: 0 | Status: ACTIVE | COMMUNITY
Start: 2022-10-20 | End: 1900-01-01

## 2022-10-20 RX ORDER — PREDNISONE 10 MG/1
10 TABLET ORAL
Qty: 19 | Refills: 0 | Status: ACTIVE | COMMUNITY
Start: 2022-10-13

## 2022-10-20 NOTE — ASSESSMENT
[FreeTextEntry1] : Hydroxyapatite deposition disease (Basic calcium phosphate) \par -Calcific tendonitis of longus coli muscle with bony erosion of anterior dense\par -Reviewed imagining of CT neck and MRI cervical spine\par -Symptoms improved with ibuprofen, and prednisone\par -Off ibuprofen, and on prednisone 20 mg x 1 days, 10 mg x 2 days \par -Advise to complete prednisone course and if symptoms return to start Ibuprofen 400 mg TID, and if symptoms are still persistent she can start prednisone 10 mg/day until symptoms resolve. Symptoms typically resolve within 3 weeks \par -Start PT neck\par \par F/u 3 months or PRN

## 2022-10-20 NOTE — HISTORY OF PRESENT ILLNESS
[FreeTextEntry1] : Patient was admitted from 10/10-10/14 with neck pain and MRI cervical spine showed calcific tendonitis of longus coli muscle with erosion of anterior dens secondary to hydroxyapatite crystal deposition. She was treated with Ibuprofen 400 mg TID, muscle relaxers, pain medications, and then prednisone 40 mg for 2 days, with outpatient taper 40 mg x 2 days, 30 mg x 2 days, 20 mg x 2 days, 10 mg x 2 days. She is taking prednisone 20 mg today. She feels neck pain is improved on prednisone. Neck bothers her at night with finding position of comfort on her pillow. During the day it doesn’t bother her much except when she extends her neck back she is limited by pain. She gets headaches intermittently but this is also much improved. Denies visual disturbances, paresthesias, joint pain else where. She was diagnosed with L frozen shoulder  2 years ago follows with orthopedics (Dr. Frank Newman) with resolution of her symptoms via PT, and in January 2022 she was diagnosed with right frozen shoulder and goes to PT with improvement, but not back to normal in her range of motion.

## 2022-10-20 NOTE — PHYSICAL EXAM
[General Appearance - Alert] : alert [General Appearance - In No Acute Distress] : in no acute distress [Sclera] : the sclera and conjunctiva were normal [PERRL With Normal Accommodation] : pupils were equal in size, round, and reactive to light [Extraocular Movements] : extraocular movements were intact [Outer Ear] : the ears and nose were normal in appearance [Oropharynx] : the oropharynx was normal [Neck Appearance] : the appearance of the neck was normal [FreeTextEntry1] : Limited in neck extension. Rotation left and right, and flexion is normal. No TTP in cervical spine or paraspinal musculature [Auscultation Breath Sounds / Voice Sounds] : lungs were clear to auscultation bilaterally [Heart Rate And Rhythm] : heart rate was normal and rhythm regular [Heart Sounds] : normal S1 and S2 [Heart Sounds Gallop] : no gallops [Murmurs] : no murmurs [Heart Sounds Pericardial Friction Rub] : no pericardial rub [Abnormal Walk] : normal gait [Nail Clubbing] : no clubbing  or cyanosis of the fingernails [Musculoskeletal - Swelling] : no joint swelling seen [Motor Tone] : muscle strength and tone were normal [] : no rash [Affect] : the affect was normal [Mood] : the mood was normal

## 2022-11-01 ENCOUNTER — TRANSCRIPTION ENCOUNTER (OUTPATIENT)
Age: 57
End: 2022-11-01

## 2023-03-14 ENCOUNTER — OUTPATIENT (OUTPATIENT)
Dept: OUTPATIENT SERVICES | Facility: HOSPITAL | Age: 58
LOS: 1 days | End: 2023-03-14
Payer: MEDICAID

## 2023-03-14 ENCOUNTER — LABORATORY RESULT (OUTPATIENT)
Age: 58
End: 2023-03-14

## 2023-03-14 ENCOUNTER — APPOINTMENT (OUTPATIENT)
Dept: HEMATOLOGY ONCOLOGY | Facility: CLINIC | Age: 58
End: 2023-03-14
Payer: MEDICAID

## 2023-03-14 VITALS
WEIGHT: 164 LBS | HEIGHT: 62 IN | HEART RATE: 86 BPM | RESPIRATION RATE: 16 BRPM | DIASTOLIC BLOOD PRESSURE: 82 MMHG | SYSTOLIC BLOOD PRESSURE: 123 MMHG | TEMPERATURE: 97.7 F | BODY MASS INDEX: 30.18 KG/M2

## 2023-03-14 DIAGNOSIS — Z80.0 FAMILY HISTORY OF MALIGNANT NEOPLASM OF DIGESTIVE ORGANS: ICD-10-CM

## 2023-03-14 DIAGNOSIS — G72.49 OTHER INFLAMMATORY AND IMMUNE MYOPATHIES, NOT ELSEWHERE CLASSIFIED: ICD-10-CM

## 2023-03-14 PROCEDURE — 82728 ASSAY OF FERRITIN: CPT

## 2023-03-14 PROCEDURE — 99204 OFFICE O/P NEW MOD 45 MIN: CPT

## 2023-03-14 PROCEDURE — 82607 VITAMIN B-12: CPT

## 2023-03-14 PROCEDURE — 87522 HEPATITIS C REVRS TRNSCRPJ: CPT

## 2023-03-14 PROCEDURE — 86704 HEP B CORE ANTIBODY TOTAL: CPT

## 2023-03-14 PROCEDURE — 86231 EMA EACH IG CLASS: CPT

## 2023-03-14 PROCEDURE — 87340 HEPATITIS B SURFACE AG IA: CPT

## 2023-03-14 PROCEDURE — 83550 IRON BINDING TEST: CPT

## 2023-03-14 PROCEDURE — 86256 FLUORESCENT ANTIBODY TITER: CPT

## 2023-03-14 PROCEDURE — 85027 COMPLETE CBC AUTOMATED: CPT

## 2023-03-14 PROCEDURE — 36415 COLL VENOUS BLD VENIPUNCTURE: CPT

## 2023-03-14 PROCEDURE — 83921 ORGANIC ACID SINGLE QUANT: CPT

## 2023-03-14 PROCEDURE — 80076 HEPATIC FUNCTION PANEL: CPT

## 2023-03-14 PROCEDURE — 86706 HEP B SURFACE ANTIBODY: CPT

## 2023-03-14 PROCEDURE — 82784 ASSAY IGA/IGD/IGG/IGM EACH: CPT

## 2023-03-14 PROCEDURE — 80048 BASIC METABOLIC PNL TOTAL CA: CPT

## 2023-03-14 PROCEDURE — 83540 ASSAY OF IRON: CPT

## 2023-03-14 PROCEDURE — 81001 URINALYSIS AUTO W/SCOPE: CPT

## 2023-03-14 PROCEDURE — 84165 PROTEIN E-PHORESIS SERUM: CPT

## 2023-03-14 PROCEDURE — 82746 ASSAY OF FOLIC ACID SERUM: CPT

## 2023-03-14 RX ORDER — PANTOPRAZOLE 40 MG/1
40 TABLET, DELAYED RELEASE ORAL
Refills: 0 | Status: ACTIVE | COMMUNITY

## 2023-03-14 NOTE — HISTORY OF PRESENT ILLNESS
[de-identified] : Ms. CYNTHIA ALANIZ is a 57 year old female here today for evaluation and management of anemia.\par \par CYNTHIA is a 57 year old F with PMHx including calcific tendonitis, dyslipidemia, diverticulitis, hemorrhoids s/p surgery who presents to clinic to establish care.  She states she was told she had microscopic trace hematuria recently.  Patient has not been seen by urologist.  LMP was more than 6 years ago.  No history of menorrhagia.  She endorse fatigue.  Patient denies CP, palpitations, dizziness, dyspnea, unintentional weight loss or bleeding.  She reports occasional dizziness, last episode was about a month but she has had MR imaging in the past which was reportedly unremarkable.  Patient reports no known family history of malignancy or blood/clotting disorder.\par \par RADIOLOGIC WORKUP\par CT A/P (2.2.2017) IMPRESSION: Acute sigmoid diverticulitis. No evidence of abscess or significant intraperitoneal free air.\par \par LAB WORKUP\par (12.29.2022 - [facility]) WBC 6.2, Hgb 10.8, MCV 86, , ESR 55, TSH 1.34, ferritin 191, TIBC 252, ironsat 19%, Cr 0.68, eGFR 102, normal LFTs, Vitamin B12 is 986, folate 10.4, COVID Ab >800\par (10.14.2022) WBC 6.22, Hgb 10.2, HCT 30.7, MCV 87, , MIGUELINA (-), RF normal\par (2.1.2017) WBC 11.74, Hgb 11, MCV 83.6, \par \par HCM\par Colonoscopy done 2019 showed diverticulosis \par Upper Endoscopy done 2022 showed hiatal hernia , gastritis \par Mammography done 08/2022 BI-RADS Category 1:  Negative

## 2023-03-14 NOTE — ASSESSMENT
[FreeTextEntry1] : # Anemia, mild, normocytic , essentially stable since 2017\par - s/p Colonoscopy done 2019 showed diverticulosis ; requested to obtain reports from colonoscopy in 2019 \par - s/p Upper Endoscopy done 2022 showed hiatal hernia , gastritis \par - followup with GI + GYN as recommended for further workup and age appropriate screenings\par - Labwork today: CBC, BMP, LFTs, iron studies, ferritin, Vitamin B12, folate, MMA, Hep B/C, endomysial Ab, UA \par - advised to followup with [facility], Dr. Quintero, for additional workup if persistent microscopic hematuria \par \par RTC in 3 weeks , sooner if needed \par \par seen/ examined w/ NP Frantz ; note reviewed; case reviewed\par 58 yo woman with anemia of unclear etiology\par labs today including iron studies, b12/ folate/ MMA, myeloma panel; will get UA\par need to get copies of EGD and colonoscopy\par

## 2023-03-14 NOTE — CONSULT LETTER
[Dear  ___] : Dear  [unfilled], [Please see my note below.] : Please see my note below. [Sincerely,] : Sincerely, [FreeTextEntry3] : Julián Casey

## 2023-03-15 DIAGNOSIS — G72.49 OTHER INFLAMMATORY AND IMMUNE MYOPATHIES, NOT ELSEWHERE CLASSIFIED: ICD-10-CM

## 2023-03-15 LAB
ALBUMIN SERPL ELPH-MCNC: 4.6 G/DL
ALP BLD-CCNC: 97 U/L
ALT SERPL-CCNC: 15 U/L
ANION GAP SERPL CALC-SCNC: 12 MMOL/L
APPEARANCE: CLEAR
AST SERPL-CCNC: 16 U/L
BILIRUB DIRECT SERPL-MCNC: <0.2 MG/DL
BILIRUB INDIRECT SERPL-MCNC: NORMAL MG/DL
BILIRUB SERPL-MCNC: <0.2 MG/DL
BILIRUBIN URINE: NEGATIVE
BLOOD URINE: ABNORMAL
BUN SERPL-MCNC: 15 MG/DL
CALCIUM SERPL-MCNC: 9 MG/DL
CHLORIDE SERPL-SCNC: 101 MMOL/L
CO2 SERPL-SCNC: 26 MMOL/L
COLOR: COLORLESS
CREAT SERPL-MCNC: 0.9 MG/DL
EGFR: 75 ML/MIN/1.73M2
FERRITIN SERPL-MCNC: 123 NG/ML
FOLATE SERPL-MCNC: 12.3 NG/ML
GLUCOSE QUALITATIVE U: NEGATIVE
GLUCOSE SERPL-MCNC: 124 MG/DL
HBV CORE IGG+IGM SER QL: NONREACTIVE
HBV SURFACE AB SER QL: NONREACTIVE
HBV SURFACE AG SER QL: NONREACTIVE
HCT VFR BLD CALC: 31.5 %
HGB BLD-MCNC: 10.5 G/DL
IGA SER QL IEP: 219 MG/DL
IGM SER QL IEP: 99 MG/DL
IRON SATN MFR SERPL: 20 %
IRON SERPL-MCNC: 55 UG/DL
KETONES URINE: NEGATIVE
LEUKOCYTE ESTERASE URINE: NEGATIVE
MCHC RBC-ENTMCNC: 27.5 PG
MCHC RBC-ENTMCNC: 33.3 G/DL
MCV RBC AUTO: 82.5 FL
NITRITE URINE: NEGATIVE
PH URINE: 7
PLATELET # BLD AUTO: 240 K/UL
PMV BLD: 9.6 FL
POTASSIUM SERPL-SCNC: 4.1 MMOL/L
PROT SERPL-MCNC: 7 G/DL
PROTEIN URINE: NEGATIVE
RBC # BLD: 3.82 M/UL
RBC # FLD: 12.2 %
SODIUM SERPL-SCNC: 139 MMOL/L
SPECIFIC GRAVITY URINE: 1.01
TIBC SERPL-MCNC: 271 UG/DL
UIBC SERPL-MCNC: 216 UG/DL
UROBILINOGEN URINE: NORMAL
VIT B12 SERPL-MCNC: 836 PG/ML
WBC # FLD AUTO: 5.07 K/UL

## 2023-03-16 LAB
ENDOMYSIUM IGA SER QL: NEGATIVE
ENDOMYSIUM IGA TITR SER: NORMAL

## 2023-03-17 LAB
ALBUMIN MFR SERPL ELPH: 61.3 %
ALBUMIN SERPL-MCNC: 4.5 G/DL
ALBUMIN/GLOB SERPL: 1.6 RATIO
ALPHA1 GLOB MFR SERPL ELPH: 4 %
ALPHA1 GLOB SERPL ELPH-MCNC: 0.3 G/DL
ALPHA2 GLOB MFR SERPL ELPH: 9.1 %
ALPHA2 GLOB SERPL ELPH-MCNC: 0.7 G/DL
B-GLOBULIN MFR SERPL ELPH: 10.9 %
B-GLOBULIN SERPL ELPH-MCNC: 0.8 G/DL
DEPRECATED KAPPA LC FREE/LAMBDA SER: 1.27 RATIO
GAMMA GLOB FLD ELPH-MCNC: 1.1 G/DL
GAMMA GLOB MFR SERPL ELPH: 14.7 %
HCV RNA SERPL NAA+PROBE-LOG IU: NOT DETECTED LOGIU/ML
HEPC RNA INTERP: NOT DETECTED IU/ML
IGA SER QL IEP: 219 MG/DL
IGG SER QL IEP: 1111 MG/DL
IGM SER QL IEP: 99 MG/DL
INTERPRETATION SERPL IEP-IMP: NORMAL
KAPPA LC CSF-MCNC: 1.41 MG/DL
KAPPA LC SERPL-MCNC: 1.79 MG/DL
M PROTEIN SPEC IFE-MCNC: NORMAL
PROT SERPL-MCNC: 7.3 G/DL
PROT SERPL-MCNC: 7.3 G/DL

## 2023-03-20 LAB — METHYLMALONATE SERPL-SCNC: 148 NMOL/L

## 2023-04-19 ENCOUNTER — APPOINTMENT (OUTPATIENT)
Dept: HEMATOLOGY ONCOLOGY | Facility: CLINIC | Age: 58
End: 2023-04-19
Payer: MEDICAID

## 2023-04-19 ENCOUNTER — OUTPATIENT (OUTPATIENT)
Dept: OUTPATIENT SERVICES | Facility: HOSPITAL | Age: 58
LOS: 1 days | End: 2023-04-19
Payer: MEDICAID

## 2023-04-19 VITALS
HEART RATE: 79 BPM | SYSTOLIC BLOOD PRESSURE: 134 MMHG | RESPIRATION RATE: 16 BRPM | DIASTOLIC BLOOD PRESSURE: 79 MMHG | TEMPERATURE: 98 F | BODY MASS INDEX: 30.91 KG/M2 | WEIGHT: 168 LBS | HEIGHT: 62 IN

## 2023-04-19 DIAGNOSIS — G72.49 OTHER INFLAMMATORY AND IMMUNE MYOPATHIES, NOT ELSEWHERE CLASSIFIED: ICD-10-CM

## 2023-04-19 DIAGNOSIS — D64.9 ANEMIA, UNSPECIFIED: ICD-10-CM

## 2023-04-19 PROCEDURE — 99214 OFFICE O/P EST MOD 30 MIN: CPT

## 2023-04-19 PROCEDURE — 83020 HEMOGLOBIN ELECTROPHORESIS: CPT | Mod: 26

## 2023-04-19 PROCEDURE — 36415 COLL VENOUS BLD VENIPUNCTURE: CPT

## 2023-04-19 PROCEDURE — 83020 HEMOGLOBIN ELECTROPHORESIS: CPT

## 2023-04-19 NOTE — HISTORY OF PRESENT ILLNESS
[de-identified] : Ms. CYNTHIA ALANIZ is a 57 year old female here today for evaluation and management of anemia.\par \par CYNTHIA is a 57 year old F with PMHx including calcific tendonitis, dyslipidemia, diverticulitis, hemorrhoids s/p surgery who presents to clinic to establish care.  She states she was told she had microscopic trace hematuria recently.  Patient has not been seen by urologist.  LMP was more than 6 years ago.  No history of menorrhagia.  She endorse fatigue.  Patient denies CP, palpitations, dizziness, dyspnea, unintentional weight loss or bleeding.  She reports occasional dizziness, last episode was about a month but she has had MR imaging in the past which was reportedly unremarkable.  Patient reports no known family history of malignancy or blood/clotting disorder.\par \par RADIOLOGIC WORKUP\par CT A/P (2.2.2017) IMPRESSION: Acute sigmoid diverticulitis. No evidence of abscess or significant intraperitoneal free air.\par \par LAB WORKUP\par (12.29.2022 - [facility]) WBC 6.2, Hgb 10.8, MCV 86, , ESR 55, TSH 1.34, ferritin 191, TIBC 252, ironsat 19%, Cr 0.68, eGFR 102, normal LFTs, Vitamin B12 is 986, folate 10.4, COVID Ab >800\par (10.14.2022) WBC 6.22, Hgb 10.2, HCT 30.7, MCV 87, , MIGUELINA (-), RF normal\par (2.1.2017) WBC 11.74, Hgb 11, MCV 83.6, \par \par HCM\par Colonoscopy done 2019 showed diverticulosis \par Upper Endoscopy done 2022 showed hiatal hernia , gastritis \par Mammography done 08/2022 BI-RADS Category 1:  Negative [de-identified] : 4/19/23\par Pt is here for follow up of anemia. She denies any symptoms.

## 2023-04-19 NOTE — ASSESSMENT
[FreeTextEntry1] : # Anemia, mild, normocytic , essentially stable since 2017\par - s/p Colonoscopy done 2019 showed diverticulosis ; requested to obtain reports from colonoscopy in 2019 \par - s/p Upper Endoscopy done 2022 showed hiatal hernia , gastritis \par - followup with GI + GYN as recommended for further workup and age appropriate screenings\par -  CBC Hb 10; wbc and platelet count wnl.\par - BMP and Liver function tests wnl\par -  iron studies, ferritin, Vitamin B12, folate, MMA wnl.\par -Hep B/C, endomysial Ab, UA negative.\par -SPEP normal\par -serum Ig A and serum Ig M wnl.\par - advised to followup with U[facility], Dr. Quintero, for additional workup if persistent microscopic hematuria \par \par # Neck pain\par -pt was following rheumatologist ; no diagnosis established. \par \par RTC in 3 weeks , sooner if needed \par \par seen/ examined w/ adrian Dr. Dhulipalla ; note reviewed; case reviewed\par 56 yo woman with anemia of unclear etiology\par labs today including cbc, hemoglobin electrophoresis\par RTC 3 months.\par \par

## 2023-04-20 LAB
HGB A MFR BLD: 97.8 %
HGB A2 MFR BLD: 2.2 %
HGB FRACT BLD-IMP: NORMAL

## 2023-06-12 ENCOUNTER — EMERGENCY (EMERGENCY)
Facility: HOSPITAL | Age: 58
LOS: 0 days | Discharge: LEFT BEFORE DISCHARGE | End: 2023-06-13
Attending: EMERGENCY MEDICINE
Payer: MEDICAID

## 2023-06-12 VITALS
TEMPERATURE: 99 F | SYSTOLIC BLOOD PRESSURE: 145 MMHG | DIASTOLIC BLOOD PRESSURE: 68 MMHG | OXYGEN SATURATION: 96 % | HEART RATE: 80 BPM | WEIGHT: 164.91 LBS | RESPIRATION RATE: 16 BRPM

## 2023-06-12 DIAGNOSIS — R10.30 LOWER ABDOMINAL PAIN, UNSPECIFIED: ICD-10-CM

## 2023-06-12 DIAGNOSIS — E78.5 HYPERLIPIDEMIA, UNSPECIFIED: ICD-10-CM

## 2023-06-12 DIAGNOSIS — Z87.19 PERSONAL HISTORY OF OTHER DISEASES OF THE DIGESTIVE SYSTEM: ICD-10-CM

## 2023-06-12 DIAGNOSIS — Z53.21 PROCEDURE AND TREATMENT NOT CARRIED OUT DUE TO PATIENT LEAVING PRIOR TO BEING SEEN BY HEALTH CARE PROVIDER: ICD-10-CM

## 2023-06-12 LAB
APPEARANCE UR: CLEAR — SIGNIFICANT CHANGE UP
BILIRUB UR-MCNC: NEGATIVE — SIGNIFICANT CHANGE UP
COLOR SPEC: ABNORMAL
DIFF PNL FLD: ABNORMAL
GLUCOSE UR QL: NEGATIVE — SIGNIFICANT CHANGE UP
KETONES UR-MCNC: NEGATIVE — SIGNIFICANT CHANGE UP
LEUKOCYTE ESTERASE UR-ACNC: NEGATIVE — SIGNIFICANT CHANGE UP
NITRITE UR-MCNC: POSITIVE
PH UR: 6 — SIGNIFICANT CHANGE UP (ref 5–8)
PROT UR-MCNC: NEGATIVE — SIGNIFICANT CHANGE UP
SP GR SPEC: 1.02 — SIGNIFICANT CHANGE UP (ref 1.01–1.03)
UROBILINOGEN FLD QL: SIGNIFICANT CHANGE UP

## 2023-06-12 PROCEDURE — 81001 URINALYSIS AUTO W/SCOPE: CPT

## 2023-06-12 PROCEDURE — 99284 EMERGENCY DEPT VISIT MOD MDM: CPT

## 2023-06-12 PROCEDURE — 99281 EMR DPT VST MAYX REQ PHY/QHP: CPT

## 2023-06-12 PROCEDURE — 87086 URINE CULTURE/COLONY COUNT: CPT

## 2023-06-12 NOTE — ED ADULT NURSE NOTE - NSFALLUNIVINTERV_ED_ALL_ED
Bed/Stretcher in lowest position, wheels locked, appropriate side rails in place/Call bell, personal items and telephone in reach/Instruct patient to call for assistance before getting out of bed/chair/stretcher/Non-slip footwear applied when patient is off stretcher/[place] to call system/Physically safe environment - no spills, clutter or unnecessary equipment/Purposeful proactive rounding/Room/bathroom lighting operational, light cord in reach

## 2023-06-12 NOTE — ED ADULT NURSE NOTE - NS_SISCREENINGSR_GEN_ALL_ED
[FreeTextEntry1] : COVID+ in patient with significant comorbidities and advanced age. On xarelto so paxlovid is c/i. 5 days of symptoms so molnupiravir is not an option. Will Rx MAB after extensive r/b/a discussion with patient and daughter. Negative

## 2023-06-13 ENCOUNTER — EMERGENCY (EMERGENCY)
Facility: HOSPITAL | Age: 58
LOS: 0 days | Discharge: HOME | End: 2023-06-13
Attending: STUDENT IN AN ORGANIZED HEALTH CARE EDUCATION/TRAINING PROGRAM
Payer: MEDICAID

## 2023-06-13 VITALS
TEMPERATURE: 99 F | SYSTOLIC BLOOD PRESSURE: 126 MMHG | HEART RATE: 86 BPM | RESPIRATION RATE: 18 BRPM | OXYGEN SATURATION: 99 % | DIASTOLIC BLOOD PRESSURE: 59 MMHG

## 2023-06-13 VITALS
RESPIRATION RATE: 18 BRPM | DIASTOLIC BLOOD PRESSURE: 66 MMHG | OXYGEN SATURATION: 98 % | HEART RATE: 88 BPM | SYSTOLIC BLOOD PRESSURE: 125 MMHG | WEIGHT: 164.91 LBS | HEIGHT: 64 IN | TEMPERATURE: 99 F

## 2023-06-13 DIAGNOSIS — M89.8X8 OTHER SPECIFIED DISORDERS OF BONE, OTHER SITE: ICD-10-CM

## 2023-06-13 DIAGNOSIS — K57.30 DIVERTICULOSIS OF LARGE INTESTINE WITHOUT PERFORATION OR ABSCESS WITHOUT BLEEDING: ICD-10-CM

## 2023-06-13 DIAGNOSIS — Z87.19 PERSONAL HISTORY OF OTHER DISEASES OF THE DIGESTIVE SYSTEM: ICD-10-CM

## 2023-06-13 DIAGNOSIS — R10.30 LOWER ABDOMINAL PAIN, UNSPECIFIED: ICD-10-CM

## 2023-06-13 DIAGNOSIS — E78.5 HYPERLIPIDEMIA, UNSPECIFIED: ICD-10-CM

## 2023-06-13 LAB
ALBUMIN SERPL ELPH-MCNC: 4.8 G/DL — SIGNIFICANT CHANGE UP (ref 3.5–5.2)
ALP SERPL-CCNC: 89 U/L — SIGNIFICANT CHANGE UP (ref 30–115)
ALT FLD-CCNC: 15 U/L — SIGNIFICANT CHANGE UP (ref 0–41)
ANION GAP SERPL CALC-SCNC: 14 MMOL/L — SIGNIFICANT CHANGE UP (ref 7–14)
APPEARANCE UR: CLEAR — SIGNIFICANT CHANGE UP
AST SERPL-CCNC: 16 U/L — SIGNIFICANT CHANGE UP (ref 0–41)
BACTERIA # UR AUTO: NEGATIVE — SIGNIFICANT CHANGE UP
BACTERIA # UR AUTO: NEGATIVE — SIGNIFICANT CHANGE UP
BASOPHILS # BLD AUTO: 0.03 K/UL — SIGNIFICANT CHANGE UP (ref 0–0.2)
BASOPHILS NFR BLD AUTO: 0.3 % — SIGNIFICANT CHANGE UP (ref 0–1)
BILIRUB DIRECT SERPL-MCNC: <0.2 MG/DL — SIGNIFICANT CHANGE UP (ref 0–0.3)
BILIRUB INDIRECT FLD-MCNC: >0.4 MG/DL — SIGNIFICANT CHANGE UP (ref 0.2–1.2)
BILIRUB SERPL-MCNC: 0.6 MG/DL — SIGNIFICANT CHANGE UP (ref 0.2–1.2)
BILIRUB UR-MCNC: NEGATIVE — SIGNIFICANT CHANGE UP
BUN SERPL-MCNC: 8 MG/DL — LOW (ref 10–20)
CALCIUM SERPL-MCNC: 9.4 MG/DL — SIGNIFICANT CHANGE UP (ref 8.4–10.5)
CHLORIDE SERPL-SCNC: 98 MMOL/L — SIGNIFICANT CHANGE UP (ref 98–110)
CO2 SERPL-SCNC: 26 MMOL/L — SIGNIFICANT CHANGE UP (ref 17–32)
COLOR SPEC: SIGNIFICANT CHANGE UP
CREAT SERPL-MCNC: 0.8 MG/DL — SIGNIFICANT CHANGE UP (ref 0.7–1.5)
DIFF PNL FLD: ABNORMAL
EGFR: 86 ML/MIN/1.73M2 — SIGNIFICANT CHANGE UP
EOSINOPHIL # BLD AUTO: 0.02 K/UL — SIGNIFICANT CHANGE UP (ref 0–0.7)
EOSINOPHIL NFR BLD AUTO: 0.2 % — SIGNIFICANT CHANGE UP (ref 0–8)
EPI CELLS # UR: 1 /HPF — SIGNIFICANT CHANGE UP (ref 0–5)
EPI CELLS # UR: 1 /HPF — SIGNIFICANT CHANGE UP (ref 0–5)
GLUCOSE SERPL-MCNC: 97 MG/DL — SIGNIFICANT CHANGE UP (ref 70–99)
GLUCOSE UR QL: NEGATIVE — SIGNIFICANT CHANGE UP
HCG SERPL QL: POSITIVE — SIGNIFICANT CHANGE UP
HCT VFR BLD CALC: 34.4 % — LOW (ref 37–47)
HGB BLD-MCNC: 11.5 G/DL — LOW (ref 12–16)
HYALINE CASTS # UR AUTO: 1 /LPF — SIGNIFICANT CHANGE UP (ref 0–7)
HYALINE CASTS # UR AUTO: 2 /LPF — SIGNIFICANT CHANGE UP (ref 0–7)
IMM GRANULOCYTES NFR BLD AUTO: 0.3 % — SIGNIFICANT CHANGE UP (ref 0.1–0.3)
KETONES UR-MCNC: NEGATIVE — SIGNIFICANT CHANGE UP
LACTATE SERPL-SCNC: 0.9 MMOL/L — SIGNIFICANT CHANGE UP (ref 0.7–2)
LEUKOCYTE ESTERASE UR-ACNC: NEGATIVE — SIGNIFICANT CHANGE UP
LIDOCAIN IGE QN: 26 U/L — SIGNIFICANT CHANGE UP (ref 7–60)
LYMPHOCYTES # BLD AUTO: 2.01 K/UL — SIGNIFICANT CHANGE UP (ref 1.2–3.4)
LYMPHOCYTES # BLD AUTO: 20.6 % — SIGNIFICANT CHANGE UP (ref 20.5–51.1)
MCHC RBC-ENTMCNC: 28.2 PG — SIGNIFICANT CHANGE UP (ref 27–31)
MCHC RBC-ENTMCNC: 33.4 G/DL — SIGNIFICANT CHANGE UP (ref 32–37)
MCV RBC AUTO: 84.3 FL — SIGNIFICANT CHANGE UP (ref 81–99)
MONOCYTES # BLD AUTO: 0.68 K/UL — HIGH (ref 0.1–0.6)
MONOCYTES NFR BLD AUTO: 7 % — SIGNIFICANT CHANGE UP (ref 1.7–9.3)
NEUTROPHILS # BLD AUTO: 6.97 K/UL — HIGH (ref 1.4–6.5)
NEUTROPHILS NFR BLD AUTO: 71.6 % — SIGNIFICANT CHANGE UP (ref 42.2–75.2)
NITRITE UR-MCNC: NEGATIVE — SIGNIFICANT CHANGE UP
NRBC # BLD: 0 /100 WBCS — SIGNIFICANT CHANGE UP (ref 0–0)
PH UR: 7 — SIGNIFICANT CHANGE UP (ref 5–8)
PLATELET # BLD AUTO: 260 K/UL — SIGNIFICANT CHANGE UP (ref 130–400)
PMV BLD: 9.5 FL — SIGNIFICANT CHANGE UP (ref 7.4–10.4)
POTASSIUM SERPL-MCNC: 3.8 MMOL/L — SIGNIFICANT CHANGE UP (ref 3.5–5)
POTASSIUM SERPL-SCNC: 3.8 MMOL/L — SIGNIFICANT CHANGE UP (ref 3.5–5)
PROT SERPL-MCNC: 7.5 G/DL — SIGNIFICANT CHANGE UP (ref 6–8)
PROT UR-MCNC: NEGATIVE — SIGNIFICANT CHANGE UP
RBC # BLD: 4.08 M/UL — LOW (ref 4.2–5.4)
RBC # FLD: 12.3 % — SIGNIFICANT CHANGE UP (ref 11.5–14.5)
RBC CASTS # UR COMP ASSIST: 0 /HPF — SIGNIFICANT CHANGE UP (ref 0–4)
RBC CASTS # UR COMP ASSIST: 9 /HPF — HIGH (ref 0–4)
SODIUM SERPL-SCNC: 138 MMOL/L — SIGNIFICANT CHANGE UP (ref 135–146)
SP GR SPEC: 1.01 — LOW (ref 1.01–1.03)
UROBILINOGEN FLD QL: SIGNIFICANT CHANGE UP
WBC # BLD: 9.74 K/UL — SIGNIFICANT CHANGE UP (ref 4.8–10.8)
WBC # FLD AUTO: 9.74 K/UL — SIGNIFICANT CHANGE UP (ref 4.8–10.8)
WBC UR QL: 1 /HPF — SIGNIFICANT CHANGE UP (ref 0–5)
WBC UR QL: 2 /HPF — SIGNIFICANT CHANGE UP (ref 0–5)

## 2023-06-13 PROCEDURE — 99285 EMERGENCY DEPT VISIT HI MDM: CPT

## 2023-06-13 PROCEDURE — 96374 THER/PROPH/DIAG INJ IV PUSH: CPT | Mod: XU

## 2023-06-13 PROCEDURE — 83690 ASSAY OF LIPASE: CPT

## 2023-06-13 PROCEDURE — 87086 URINE CULTURE/COLONY COUNT: CPT

## 2023-06-13 PROCEDURE — 96361 HYDRATE IV INFUSION ADD-ON: CPT | Mod: XU

## 2023-06-13 PROCEDURE — 81001 URINALYSIS AUTO W/SCOPE: CPT

## 2023-06-13 PROCEDURE — 83605 ASSAY OF LACTIC ACID: CPT

## 2023-06-13 PROCEDURE — 36415 COLL VENOUS BLD VENIPUNCTURE: CPT

## 2023-06-13 PROCEDURE — 84703 CHORIONIC GONADOTROPIN ASSAY: CPT

## 2023-06-13 PROCEDURE — 99284 EMERGENCY DEPT VISIT MOD MDM: CPT | Mod: 25

## 2023-06-13 PROCEDURE — 80076 HEPATIC FUNCTION PANEL: CPT

## 2023-06-13 PROCEDURE — 85025 COMPLETE CBC W/AUTO DIFF WBC: CPT

## 2023-06-13 PROCEDURE — 74177 CT ABD & PELVIS W/CONTRAST: CPT | Mod: MA

## 2023-06-13 PROCEDURE — 80048 BASIC METABOLIC PNL TOTAL CA: CPT

## 2023-06-13 PROCEDURE — 74177 CT ABD & PELVIS W/CONTRAST: CPT | Mod: 26,MA

## 2023-06-13 RX ORDER — SODIUM CHLORIDE 9 MG/ML
1000 INJECTION INTRAMUSCULAR; INTRAVENOUS; SUBCUTANEOUS ONCE
Refills: 0 | Status: COMPLETED | OUTPATIENT
Start: 2023-06-13 | End: 2023-06-13

## 2023-06-13 RX ORDER — MORPHINE SULFATE 50 MG/1
4 CAPSULE, EXTENDED RELEASE ORAL ONCE
Refills: 0 | Status: DISCONTINUED | OUTPATIENT
Start: 2023-06-13 | End: 2023-06-13

## 2023-06-13 RX ADMIN — SODIUM CHLORIDE 1000 MILLILITER(S): 9 INJECTION INTRAMUSCULAR; INTRAVENOUS; SUBCUTANEOUS at 18:15

## 2023-06-13 RX ADMIN — SODIUM CHLORIDE 1000 MILLILITER(S): 9 INJECTION INTRAMUSCULAR; INTRAVENOUS; SUBCUTANEOUS at 17:00

## 2023-06-13 RX ADMIN — MORPHINE SULFATE 4 MILLIGRAM(S): 50 CAPSULE, EXTENDED RELEASE ORAL at 17:00

## 2023-06-13 RX ADMIN — MORPHINE SULFATE 4 MILLIGRAM(S): 50 CAPSULE, EXTENDED RELEASE ORAL at 17:45

## 2023-06-13 NOTE — ED PROVIDER NOTE - CLINICAL SUMMARY MEDICAL DECISION MAKING FREE TEXT BOX
57 year old female with PMH diverticulitis who presents for lower abd pain associated with bloating. She was seen in the ED yesterday, had urinalysis which was neg for UTI and dc home. She denies n/v/d/f/c, flank pain, urinary sx, abn vaginal discharge or odor. vs noted, triage note reviewed. Gen - NAD, Head - NCAT, Pharynx - clear, MMM, Heart - RRR, no m/g/r, Lungs - CTAB, no w/c/r, Abdomen - soft, mildly tender infraumbilical, ND, Skin - No rash, Extremities - FROM, no edema, erythema, ecchymosis, brisk cap refill, Neuro - A&O x3, equal strength and sensation, non-focal exam. Labs and imaging personally reviewed. Analgesia given with interval improvement. 57 year old female with PMH diverticulitis who presents for lower abd pain associated with bloating and pain with BM. She was seen in the ED yesterday, had urinalysis done and subsequently eloped. She denies hematochezia, melena, n/v/d/f/c, flank pain, urinary sx, abn vaginal discharge or odor. vs noted, triage note reviewed. Gen - NAD, Head - NCAT, Pharynx - clear, MMM, Heart - RRR, no m/g/r, Lungs - CTAB, no w/c/r, Abdomen - soft, mildly tender infraumbilical, ND, Skin - No rash, Extremities - FROM, no edema, erythema, ecchymosis, brisk cap refill, Neuro - A&O x3, equal strength and sensation, non-focal exam. Labs and imaging personally reviewed. Analgesia given with interval improvement. 57 year old female with PMH diverticulitis who presents for lower abd pain associated with bloating and pain with BM. She was seen in the ED yesterday, had urinalysis done and subsequently eloped. She denies hematochezia, melena, n/v/d/f/c, flank pain, urinary sx, abn vaginal discharge or odor. vs noted, triage note reviewed. Gen - NAD, Head - NCAT, Pharynx - clear, MMM, Heart - RRR, no m/g/r, Lungs - CTAB, no w/c/r, Abdomen - soft, mildly tender infraumbilical, ND, Skin - No rash, Extremities - FROM, no edema, erythema, ecchymosis, brisk cap refill, Neuro - A&O x3, equal strength and sensation, non-focal exam. Labs and imaging personally reviewed. Analgesia given with interval improvement. CT shows uncomplicated sigmoid diverticulitis. Sclerotic focus of L2 VB noted, and discussed with patient- recommend eval with pcp and appropriate outpatient f/u imaging. I have fully discussed the medical management and delivery of care with the patient. I have discussed any available labs, imaging and treatment options with the patient. All Questions answered at the bedside and printed copies of all results provided and recommended to review with PCP. Patient confirms understanding and has been given detailed return precautions. Patient instructed to return to the ED should symptoms persist or worsen. Patient has demonstrated capacity and has verbalized understanding. Patient is well appearing upon discharge, ambulatory with a steady gait.

## 2023-06-13 NOTE — ED PROVIDER NOTE - PATIENT PORTAL LINK FT
You can access the FollowMyHealth Patient Portal offered by [facility] by registering at the following website: [email or web address]. By joining [facility]’s FollowMyHealth portal, you will also be able to view your health information using other applications (apps) compatible with our system.

## 2023-06-13 NOTE — ED PROVIDER NOTE - PROGRESS NOTE DETAILS
Patient offered labs/urine test and CT but prefers to check urine first.  Patient aware that if urine is negative would pursue further work-up to identify cause of abdominal pain.  Patient eloped prior to urinalysis coming back.

## 2023-06-13 NOTE — ED PROVIDER NOTE - NSFOLLOWUPINSTRUCTIONS_ED_ALL_ED_FT
PLEASE TAKE ANTIBIOTICS AS PRESCRIBED. PLEASE REVIEW ALL RESULTS, ESPECIALLY CT RESULTS WITH YOUR PRIMARY DOCTOR. YOU WERE FOUND TO HAVE AN INCIDENTAL SCLEROTIC FOCUS OF L2 VERTEBRAL BODY 0.8CM. PLEASE REVIEW AND OBTAIN APPROPRIATE OUTPATIENT FOLLOW UP IMAGING.     Diverticulitis  Diverticulitis is infection or inflammation of small pouches (diverticula) in the colon that form due to a condition called diverticulosis. Diverticula can trap stool (feces) and bacteria, causing infection and inflammation.    Diverticulitis may cause severe stomach pain and diarrhea. It may lead to tissue damage in the colon that causes bleeding. The diverticula may also burst (rupture) and cause infected stool to enter other areas of the abdomen.    Complications of diverticulitis can include:  Bleeding.  Severe infection.  Severe pain.  Rupture (perforation) of the colon.  Blockage (obstruction) of the colon.  What are the causes?  This condition is caused by stool becoming trapped in the diverticula, which allows bacteria to grow in the diverticula. This leads to inflammation and infection.    What increases the risk?  You are more likely to develop this condition if:  You have diverticulosis. The risk for diverticulosis increases if:  You are overweight or obese.  You use tobacco products.  You do not get enough exercise.  You eat a diet that does not include enough fiber. High-fiber foods include fruits, vegetables, beans, nuts, and whole grains.  What are the signs or symptoms?  Symptoms of this condition may include:  Pain and tenderness in the abdomen. The pain is normally located on the left side of the abdomen, but it may occur in other areas.  Fever and chills.  Bloating.  Cramping.  Nausea.  Vomiting.  Changes in bowel routines.  Blood in your stool.  How is this diagnosed?  This condition is diagnosed based on:  Your medical history.  A physical exam.  Tests to make sure there is nothing else causing your condition. These tests may include:  Blood tests.  Urine tests.  Imaging tests of the abdomen, including X-rays, ultrasounds, MRIs, or CT scans.  How is this treated?  Most cases of this condition are mild and can be treated at home. Treatment may include:  Taking over-the-counter pain medicines.  Following a clear liquid diet.  Taking antibiotic medicines by mouth.  Rest.  More severe cases may need to be treated at a hospital. Treatment may include:  Not eating or drinking.  Taking prescription pain medicine.  Receiving antibiotic medicines through an IV tube.  Receiving fluids and nutrition through an IV tube.  Surgery.  When your condition is under control, your health care provider may recommend that you have a colonoscopy. This is an exam to look at the entire large intestine. During the exam, a lubricated, bendable tube is inserted into the anus and then passed into the rectum, colon, and other parts of the large intestine. A colonoscopy can show how severe your diverticula are and whether something else may be causing your symptoms.    Follow these instructions at home:  Medicines     Take over-the-counter and prescription medicines only as told by your health care provider. These include fiber supplements, probiotics, and stool softeners.  If you were prescribed an antibiotic medicine, take it as told by your health care provider. Do not stop taking the antibiotic even if you start to feel better.  Do not drive or use heavy machinery while taking prescription pain medicine.  General instructions     Follow a full liquid diet or another diet as directed by your health care provider. After your symptoms improve, your health care provider may tell you to change your diet. He or she may recommend that you eat a diet that contains at least 25 g (25 grams) of fiber daily. Fiber makes it easier to pass stool. Healthy sources of fiber include:  Berries. One cup contains 4–8 grams of fiber.  Beans or lentils. One half cup contains 5–8 grams of fiber.  Green vegetables. One cup contains 4 grams of fiber.  Exercise for at least 30 minutes, 3 times each week. You should exercise hard enough to raise your heart rate and break a sweat.  Keep all follow-up visits as told by your health care provider. This is important. You may need a colonoscopy.  Contact a health care provider if:  Your pain does not improve.  You have a hard time drinking or eating food.  Your bowel movements do not return to normal.  Get help right away if:  Your pain gets worse.  Your symptoms do not get better with treatment.  Your symptoms suddenly get worse.  You have a fever.  You vomit more than one time.  You have stools that are bloody, black, or tarry.  Summary  Diverticulitis is infection or inflammation of small pouches (diverticula) in the colon that form due to a condition called diverticulosis. Diverticula can trap stool (feces) and bacteria, causing infection and inflammation.  You are at higher risk for this condition if you have diverticulosis and you eat a diet that does not include enough fiber.  Most cases of this condition are mild and can be treated at home. More severe cases may need to be treated at a hospital.  When your condition is under control, your health care provider may recommend that you have an exam called a colonoscopy. This exam can show how severe your diverticula are and whether something else may be causing your symptoms.  This information is not intended to replace advice given to you by your health care provider. Make sure you discuss any questions you have with your health care provider.

## 2023-06-13 NOTE — ED PROVIDER NOTE - NS ED ATTENDING STATEMENT MOD
This was a shared visit with the JAE. I reviewed and verified the documentation and independently performed the documented:

## 2023-06-13 NOTE — ED PROVIDER NOTE - OBJECTIVE STATEMENT
57-year-old female with past medical history of HLD and diverticulitis presents to the ED complaining of mild lower abdominal pain that has been ongoing since Saturday.  Pain is cramp-like, constant, unrelated to eating.  Patient has been taking OTC cranberry and UTI medication without much improvement.  She denies other complaints. Pt denies fever, chills, nausea, vomiting, diarrhea, headache, dizziness, weakness, chest pain, SOB, back pain, LOC, trauma, urinary symptoms, cough, calf pain/swelling, recent travel, recent surgery.

## 2023-06-13 NOTE — ED PROVIDER NOTE - MDM ORDERS SUBMITTED SELECTION
-- DO NOT REPLY / DO NOT REPLY ALL --  -- Message is from the [facility]--    Referral Request  Name of Specialist: [facility]  Provider's specialty: Other: Imaging    Medical condition for referral:  Patient is scheduled for 11/30 at 11am at [facility], was told that because she missed her first OB ultrasound she needs the referral. Patient may also need order.    Is this a NEW request?: yes      Referral ordered by: Dr. Rita Wang      Insurance type: Correct      Payor: [facility] MEDICAID / Plan: [facility] MEDICAID HMO / Product Type: T19 HMO      Preferred Delivery Method   Call when ready for pickup - phone number to notify: [phone]    Caller Information       Type Contact Phone    11/26/2021 09:45 AM CST Phone (Incoming) Nadia Jimenes (Self) [phone] ()          Alternative phone number: N/A    Turnaround time given to caller:   \"This message will be sent to [state Provider's full name]. The clinical team will return your call as soon as they review your message. Typically, it takes 3 business days to process referral requests.\"   Labs

## 2023-06-13 NOTE — ED ADULT NURSE NOTE - DRUG PRE-SCREENING (DAST -1)
Please call Kristen Montalvo and let her know that her labs were normal, except   1  Cholesterol is elevated, but better than before so continue with low cholesterol diet, no meds at this time  2  Potassium is slightly low, 3 4, normal is 3 5 to 5 0, so for now just increase potassium in her diet  3  Glucose was normal (79), she had requested an A1c which I ordered, but there is no result, so she would have to check with the lab why that one specifically wasn't done if she was still interested    Thank you! Statement Selected

## 2023-06-13 NOTE — ED ADULT NURSE NOTE - OBJECTIVE STATEMENT
57y/oF presents to the ED with x2days abdominal pain. Pt reports suprapubic abdominal pain described as constant cramping. Pt also reports pain worsens with eating. Denies CP, SOB, fevers/chills, constipation, diarrhea, HA, N/V, urinary symptoms.

## 2023-06-13 NOTE — ED PROVIDER NOTE - PHYSICAL EXAMINATION
CONSTITUTIONAL: Well-appearing; well-nourished; in no apparent distress.   EYES: PERRL; EOM intact.   ENT: normal nose; no rhinorrhea; normal pharynx with no tonsillar hypertrophy.   NECK: Supple; non-tender; no cervical lymphadenopathy.   CARDIOVASCULAR: Normal S1, S2; no murmurs, rubs, or gallops. Equal radial pulses  RESPIRATORY: Normal chest excursion with respiration; breath sounds clear and equal bilaterally; no wheezes, rhonchi, or rales.  GI/: Normal bowel sounds; non-distended; non-tender; no palpable organomegaly.   MS: No evidence of trauma or deformity. Normal ROM in all four extremities; non-tender to palpation; distal pulses are normal.   SKIN: Normal for age and race; warm; dry; good turgor; no apparent lesions or exudate.   NEURO/PSYCH: A & O x 4; grossly unremarkable. mood and manner are appropriate. Grooming and personal hygiene are appropriate.

## 2023-06-13 NOTE — ED PROVIDER NOTE - PHYSICAL EXAMINATION
VITAL SIGNS: I have reviewed nursing notes and confirm.  CONSTITUTIONAL: Well-developed; well-nourished; in no acute distress.  SKIN: Skin exam is warm and dry, no acute rash.  HEAD: Normocephalic; atraumatic.  EYES: Conjunctiva and sclera clear.  ENT: No nasal discharge; airway clear.   CARD: S1, S2 normal; no murmurs, gallops, or rubs. Regular rate and rhythm.  RESP: No wheezes, rales or rhonchi. Speaking in full sentences.   ABD: Normal bowel sounds; soft; non-distended; (+) mild suprapubic TTP; No rebound or guarding. No CVA tenderness.  EXT: Normal ROM. No clubbing, cyanosis or edema.  NEURO: Alert, oriented. Grossly unremarkable. No focal deficits.

## 2023-06-13 NOTE — ED PROVIDER NOTE - OBJECTIVE STATEMENT
57-year-old female history of hyperlipidemia, diverticulitis, non-smoker no past surgical history presenting to ER with 2 days of lower abdominal pain. Sts pain is worse after eating.  Pain is located to suprapubic area, crampy, constant, nonradiating.  No associated fever, chills, nausea, vomiting, diarrhea, bloody stools, urinary symptoms, sick contacts or recent travel.

## 2023-06-14 LAB
CULTURE RESULTS: SIGNIFICANT CHANGE UP
CULTURE RESULTS: SIGNIFICANT CHANGE UP
SPECIMEN SOURCE: SIGNIFICANT CHANGE UP
SPECIMEN SOURCE: SIGNIFICANT CHANGE UP

## 2023-07-26 NOTE — ED PROVIDER NOTE - SECONDARY DIAGNOSIS.
POST-OP DIAGNOSIS:  Chronic calculous cholecystitis 26-Jul-2023 14:08:08  Sally Saleh  
Lesion of lumbar spine

## 2023-10-07 ENCOUNTER — OUTPATIENT (OUTPATIENT)
Dept: OUTPATIENT SERVICES | Facility: HOSPITAL | Age: 58
LOS: 1 days | End: 2023-10-07
Payer: COMMERCIAL

## 2023-10-07 DIAGNOSIS — Z12.31 ENCOUNTER FOR SCREENING MAMMOGRAM FOR MALIGNANT NEOPLASM OF BREAST: ICD-10-CM

## 2023-10-07 PROCEDURE — 77063 BREAST TOMOSYNTHESIS BI: CPT

## 2023-10-07 PROCEDURE — 77063 BREAST TOMOSYNTHESIS BI: CPT | Mod: 26

## 2023-10-07 PROCEDURE — 77067 SCR MAMMO BI INCL CAD: CPT | Mod: 26

## 2023-10-07 PROCEDURE — 77067 SCR MAMMO BI INCL CAD: CPT

## 2023-10-08 DIAGNOSIS — Z12.31 ENCOUNTER FOR SCREENING MAMMOGRAM FOR MALIGNANT NEOPLASM OF BREAST: ICD-10-CM

## 2023-10-10 NOTE — ED ADULT NURSE NOTE - CAS ELECT INFOMATION PROVIDED
RN transported patient to another room. On the way patient stated discomfort in the upper right chest. RN had second RN verify chest tube. Handoff completed with boarder nurse. MD aware stat chest XRAY ordered.    DC instructions

## 2024-10-02 NOTE — OCCUPATIONAL THERAPY INITIAL EVALUATION ADULT - LEVEL OF INDEPENDENCE: STAND/SIT, REHAB EVAL
Patient presents to ED c/o pain to left hand middle finger after attempting to catch football
supervision

## 2024-10-09 ENCOUNTER — OUTPATIENT (OUTPATIENT)
Dept: OUTPATIENT SERVICES | Facility: HOSPITAL | Age: 59
LOS: 1 days | End: 2024-10-09
Payer: MEDICAID

## 2024-10-09 DIAGNOSIS — Z12.31 ENCOUNTER FOR SCREENING MAMMOGRAM FOR MALIGNANT NEOPLASM OF BREAST: ICD-10-CM

## 2024-10-09 DIAGNOSIS — Z00.00 ENCOUNTER FOR GENERAL ADULT MEDICAL EXAMINATION WITHOUT ABNORMAL FINDINGS: ICD-10-CM

## 2024-10-09 PROCEDURE — 77067 SCR MAMMO BI INCL CAD: CPT

## 2024-10-09 PROCEDURE — 77063 BREAST TOMOSYNTHESIS BI: CPT | Mod: 26

## 2024-10-09 PROCEDURE — 77067 SCR MAMMO BI INCL CAD: CPT | Mod: 26

## 2024-10-09 PROCEDURE — 77063 BREAST TOMOSYNTHESIS BI: CPT

## 2024-10-10 DIAGNOSIS — Z12.31 ENCOUNTER FOR SCREENING MAMMOGRAM FOR MALIGNANT NEOPLASM OF BREAST: ICD-10-CM
